# Patient Record
Sex: FEMALE | Race: WHITE | Employment: OTHER | URBAN - NONMETROPOLITAN AREA
[De-identification: names, ages, dates, MRNs, and addresses within clinical notes are randomized per-mention and may not be internally consistent; named-entity substitution may affect disease eponyms.]

---

## 2018-01-10 LAB
BUN BLDV-MCNC: 19 MG/DL
CALCIUM SERPL-MCNC: 8.8 MG/DL
CHLORIDE BLD-SCNC: 105 MMOL/L
CO2: 28 MMOL/L
CREAT SERPL-MCNC: 1.4 MG/DL
GFR CALCULATED: 39
GLUCOSE BLD-MCNC: 102 MG/DL
POTASSIUM SERPL-SCNC: 4 MMOL/L
SODIUM BLD-SCNC: 144 MMOL/L

## 2018-02-08 RX ORDER — LEVOTHYROXINE SODIUM 112 UG/1
112 TABLET ORAL DAILY
COMMUNITY

## 2018-02-08 RX ORDER — CHLORAL HYDRATE 500 MG
CAPSULE ORAL
COMMUNITY
End: 2019-04-05

## 2018-02-08 RX ORDER — LOSARTAN POTASSIUM 100 MG/1
100 TABLET ORAL DAILY
COMMUNITY

## 2018-02-08 RX ORDER — ATORVASTATIN CALCIUM 40 MG/1
40 TABLET, FILM COATED ORAL DAILY
COMMUNITY

## 2018-02-08 RX ORDER — AMLODIPINE BESYLATE 10 MG/1
10 TABLET ORAL DAILY
COMMUNITY
End: 2018-02-14 | Stop reason: ALTCHOICE

## 2018-02-14 ENCOUNTER — OFFICE VISIT (OUTPATIENT)
Dept: NEPHROLOGY | Age: 73
End: 2018-02-14
Payer: MEDICARE

## 2018-02-14 VITALS — DIASTOLIC BLOOD PRESSURE: 68 MMHG | HEART RATE: 72 BPM | OXYGEN SATURATION: 99 % | SYSTOLIC BLOOD PRESSURE: 134 MMHG

## 2018-02-14 DIAGNOSIS — N18.30 CKD (CHRONIC KIDNEY DISEASE), STAGE III (HCC): Primary | ICD-10-CM

## 2018-02-14 PROCEDURE — 1090F PRES/ABSN URINE INCON ASSESS: CPT | Performed by: INTERNAL MEDICINE

## 2018-02-14 PROCEDURE — G8421 BMI NOT CALCULATED: HCPCS | Performed by: INTERNAL MEDICINE

## 2018-02-14 PROCEDURE — G8427 DOCREV CUR MEDS BY ELIG CLIN: HCPCS | Performed by: INTERNAL MEDICINE

## 2018-02-14 PROCEDURE — G8400 PT W/DXA NO RESULTS DOC: HCPCS | Performed by: INTERNAL MEDICINE

## 2018-02-14 PROCEDURE — 99213 OFFICE O/P EST LOW 20 MIN: CPT | Performed by: INTERNAL MEDICINE

## 2018-02-14 PROCEDURE — 1123F ACP DISCUSS/DSCN MKR DOCD: CPT | Performed by: INTERNAL MEDICINE

## 2018-02-14 PROCEDURE — 1036F TOBACCO NON-USER: CPT | Performed by: INTERNAL MEDICINE

## 2018-02-14 PROCEDURE — 3014F SCREEN MAMMO DOC REV: CPT | Performed by: INTERNAL MEDICINE

## 2018-02-14 PROCEDURE — G8484 FLU IMMUNIZE NO ADMIN: HCPCS | Performed by: INTERNAL MEDICINE

## 2018-02-14 PROCEDURE — 3017F COLORECTAL CA SCREEN DOC REV: CPT | Performed by: INTERNAL MEDICINE

## 2018-02-14 PROCEDURE — 4040F PNEUMOC VAC/ADMIN/RCVD: CPT | Performed by: INTERNAL MEDICINE

## 2018-02-14 RX ORDER — RALOXIFENE HYDROCHLORIDE 60 MG/1
60 TABLET, FILM COATED ORAL DAILY
COMMUNITY
Start: 2018-01-18 | End: 2019-04-05

## 2018-02-14 RX ORDER — TERBINAFINE HYDROCHLORIDE 250 MG/1
250 TABLET ORAL DAILY
COMMUNITY
Start: 2018-01-05 | End: 2018-05-16

## 2018-02-14 ASSESSMENT — ENCOUNTER SYMPTOMS
SORE THROAT: 0
ABDOMINAL DISTENTION: 0
COUGH: 0
CHEST TIGHTNESS: 0
ABDOMINAL PAIN: 0
BACK PAIN: 0
RESPIRATORY NEGATIVE: 1
FACIAL SWELLING: 0
GASTROINTESTINAL NEGATIVE: 1
SHORTNESS OF BREATH: 0
WHEEZING: 0
NAUSEA: 0
VOMITING: 0
COLOR CHANGE: 0
BLOOD IN STOOL: 0
DIARRHEA: 0
CONSTIPATION: 0

## 2018-02-16 LAB
BUN BLDV-MCNC: 17 MG/DL
CALCIUM SERPL-MCNC: 9 MG/DL
CHLORIDE BLD-SCNC: 102 MMOL/L
CO2: 30 MMOL/L
CREAT SERPL-MCNC: 0.8 MG/DL
GFR CALCULATED: 60
GLUCOSE BLD-MCNC: 94 MG/DL
POTASSIUM SERPL-SCNC: 4.2 MMOL/L
SODIUM BLD-SCNC: 143 MMOL/L

## 2018-03-21 ENCOUNTER — OFFICE VISIT (OUTPATIENT)
Dept: NEPHROLOGY | Age: 73
End: 2018-03-21
Payer: MEDICARE

## 2018-03-21 VITALS
DIASTOLIC BLOOD PRESSURE: 62 MMHG | WEIGHT: 167.4 LBS | SYSTOLIC BLOOD PRESSURE: 116 MMHG | HEART RATE: 76 BPM | OXYGEN SATURATION: 98 %

## 2018-03-21 DIAGNOSIS — N18.2 CKD (CHRONIC KIDNEY DISEASE), STAGE II: Primary | ICD-10-CM

## 2018-03-21 PROCEDURE — 3014F SCREEN MAMMO DOC REV: CPT | Performed by: INTERNAL MEDICINE

## 2018-03-21 PROCEDURE — 1090F PRES/ABSN URINE INCON ASSESS: CPT | Performed by: INTERNAL MEDICINE

## 2018-03-21 PROCEDURE — 3017F COLORECTAL CA SCREEN DOC REV: CPT | Performed by: INTERNAL MEDICINE

## 2018-03-21 PROCEDURE — 1036F TOBACCO NON-USER: CPT | Performed by: INTERNAL MEDICINE

## 2018-03-21 PROCEDURE — G8427 DOCREV CUR MEDS BY ELIG CLIN: HCPCS | Performed by: INTERNAL MEDICINE

## 2018-03-21 PROCEDURE — 4040F PNEUMOC VAC/ADMIN/RCVD: CPT | Performed by: INTERNAL MEDICINE

## 2018-03-21 PROCEDURE — 99213 OFFICE O/P EST LOW 20 MIN: CPT | Performed by: INTERNAL MEDICINE

## 2018-03-21 PROCEDURE — G8484 FLU IMMUNIZE NO ADMIN: HCPCS | Performed by: INTERNAL MEDICINE

## 2018-03-21 PROCEDURE — G8400 PT W/DXA NO RESULTS DOC: HCPCS | Performed by: INTERNAL MEDICINE

## 2018-03-21 PROCEDURE — G8421 BMI NOT CALCULATED: HCPCS | Performed by: INTERNAL MEDICINE

## 2018-03-21 PROCEDURE — 1123F ACP DISCUSS/DSCN MKR DOCD: CPT | Performed by: INTERNAL MEDICINE

## 2018-03-21 RX ORDER — AMLODIPINE BESYLATE 10 MG/1
10 TABLET ORAL DAILY
COMMUNITY
Start: 2018-03-15 | End: 2018-12-27 | Stop reason: ALTCHOICE

## 2018-03-21 ASSESSMENT — ENCOUNTER SYMPTOMS
GASTROINTESTINAL NEGATIVE: 1
RESPIRATORY NEGATIVE: 1

## 2018-03-21 NOTE — PROGRESS NOTES
Visit Date: 3/21/2018      HPI:     Milena Deleon is a 71 y.o. female who presents today for:  Chief Complaint   Patient presents with    Chronic Kidney Disease     Stage II       Current Outpatient Prescriptions   Medication Sig Dispense Refill    amLODIPine (NORVASC) 10 MG tablet Take 10 mg by mouth daily       ciclopirox (PENLAC) 8 % solution       raloxifene (EVISTA) 60 MG tablet Take 60 mg by mouth daily       terbinafine (LAMISIL) 250 MG tablet Take 250 mg by mouth daily       Calcium Carb-Cholecalciferol (CALCIUM-VITAMIN D) 500-200 MG-UNIT per tablet Take 1 tablet by mouth 2 times daily (with meals)      levothyroxine (SYNTHROID) 112 MCG tablet Take 112 mcg by mouth Daily      atorvastatin (LIPITOR) 40 MG tablet Take 40 mg by mouth daily      losartan (COZAAR) 100 MG tablet Take 100 mg by mouth daily      Omega-3 1000 MG CAPS Take by mouth      Multiple Vitamins-Minerals (MULTIVITAMIN ADULTS PO) Take by mouth      Psyllium (METAMUCIL FIBER PO) Take by mouth       No current facility-administered medications for this visit. No Known Allergies    Past Medical History:   Diagnosis Date    DJD (degenerative joint disease)     Hepatic cyst     Hiatal hernia     Hyperlipidemia     Hypertension     Hypothyroidism       Past Surgical History:   Procedure Laterality Date    BLADDER REPAIR      TOTAL KNEE ARTHROPLASTY       History reviewed. No pertinent family history. Social History   Substance Use Topics    Smoking status: Never Smoker    Smokeless tobacco: Never Used    Alcohol use No        Subjective:      Review of Systems   Constitutional: Negative. HENT: Negative. Respiratory: Negative. Cardiovascular: Negative. Gastrointestinal: Negative. Musculoskeletal: Negative. Skin: Negative. Neurological: Negative. Psychiatric/Behavioral: Negative.         Objective:     BP (!) 142/78 (Site: Right Arm, Position: Sitting)   Pulse 76   Wt 167 lb 6.4 oz (75.9 kg)

## 2018-05-16 ENCOUNTER — OFFICE VISIT (OUTPATIENT)
Dept: ONCOLOGY | Age: 73
End: 2018-05-16
Payer: MEDICARE

## 2018-05-16 ENCOUNTER — HOSPITAL ENCOUNTER (OUTPATIENT)
Dept: INFUSION THERAPY | Age: 73
Discharge: HOME | End: 2018-05-16
Payer: MEDICARE

## 2018-05-16 VITALS
DIASTOLIC BLOOD PRESSURE: 75 MMHG | WEIGHT: 162 LBS | HEART RATE: 79 BPM | BODY MASS INDEX: 28.7 KG/M2 | TEMPERATURE: 97.3 F | SYSTOLIC BLOOD PRESSURE: 143 MMHG | RESPIRATION RATE: 18 BRPM | OXYGEN SATURATION: 99 % | HEIGHT: 63 IN

## 2018-05-16 DIAGNOSIS — N64.9 DISORDER OF BREAST: ICD-10-CM

## 2018-05-16 DIAGNOSIS — Z15.09 BRCA2 GENETIC CARRIER: Primary | ICD-10-CM

## 2018-05-16 DIAGNOSIS — Z15.01 BRCA2 GENETIC CARRIER: Primary | ICD-10-CM

## 2018-05-16 PROCEDURE — 1123F ACP DISCUSS/DSCN MKR DOCD: CPT | Performed by: INTERNAL MEDICINE

## 2018-05-16 PROCEDURE — 1036F TOBACCO NON-USER: CPT | Performed by: INTERNAL MEDICINE

## 2018-05-16 PROCEDURE — G8427 DOCREV CUR MEDS BY ELIG CLIN: HCPCS | Performed by: INTERNAL MEDICINE

## 2018-05-16 PROCEDURE — 4040F PNEUMOC VAC/ADMIN/RCVD: CPT | Performed by: INTERNAL MEDICINE

## 2018-05-16 PROCEDURE — 99203 OFFICE O/P NEW LOW 30 MIN: CPT | Performed by: INTERNAL MEDICINE

## 2018-05-16 PROCEDURE — G8400 PT W/DXA NO RESULTS DOC: HCPCS | Performed by: INTERNAL MEDICINE

## 2018-05-16 PROCEDURE — 99211 OFF/OP EST MAY X REQ PHY/QHP: CPT

## 2018-05-16 PROCEDURE — 3017F COLORECTAL CA SCREEN DOC REV: CPT | Performed by: INTERNAL MEDICINE

## 2018-05-16 PROCEDURE — 1090F PRES/ABSN URINE INCON ASSESS: CPT | Performed by: INTERNAL MEDICINE

## 2018-05-16 PROCEDURE — G8419 CALC BMI OUT NRM PARAM NOF/U: HCPCS | Performed by: INTERNAL MEDICINE

## 2018-05-16 RX ORDER — POLYETHYLENE GLYCOL 3350 17 G/17G
17 POWDER, FOR SOLUTION ORAL DAILY PRN
COMMUNITY
End: 2019-04-05

## 2018-05-16 RX ORDER — LORAZEPAM 1 MG/1
TABLET ORAL
Qty: 2 TABLET | Refills: 0 | Status: SHIPPED | OUTPATIENT
Start: 2018-05-16 | End: 2018-12-10 | Stop reason: SDUPTHER

## 2018-12-10 ENCOUNTER — HOSPITAL ENCOUNTER (OUTPATIENT)
Age: 73
Discharge: HOME | End: 2018-12-10
Payer: MEDICARE

## 2018-12-10 DIAGNOSIS — Z15.01 BRCA1 GENETIC CARRIER: Primary | ICD-10-CM

## 2018-12-10 DIAGNOSIS — Z15.01 BRCA2 GENETIC CARRIER: ICD-10-CM

## 2018-12-10 DIAGNOSIS — Z15.09 BRCA2 GENETIC CARRIER: ICD-10-CM

## 2018-12-10 DIAGNOSIS — Z15.09 BRCA1 GENETIC CARRIER: ICD-10-CM

## 2018-12-10 DIAGNOSIS — Z15.01 BRCA1 GENETIC CARRIER: ICD-10-CM

## 2018-12-10 DIAGNOSIS — Z15.09 BRCA1 GENETIC CARRIER: Primary | ICD-10-CM

## 2018-12-10 LAB
CREATININE, WHOLE BLOOD: 0.9 MG/DL (ref 0.5–1.2)
GFR, ESTIMATED ,CON: 65 ML/MIN/1.73M2

## 2018-12-10 PROCEDURE — 82565 ASSAY OF CREATININE: CPT

## 2018-12-10 RX ORDER — LORAZEPAM 1 MG/1
TABLET ORAL
Qty: 2 TABLET | Refills: 0 | Status: SHIPPED | OUTPATIENT
Start: 2018-12-10 | End: 2019-07-02

## 2018-12-26 DIAGNOSIS — Z15.01 BRCA2 GENETIC CARRIER: ICD-10-CM

## 2018-12-26 DIAGNOSIS — Z15.09 BRCA2 GENETIC CARRIER: ICD-10-CM

## 2018-12-26 DIAGNOSIS — N64.9 DISORDER OF BREAST: ICD-10-CM

## 2018-12-27 ENCOUNTER — OFFICE VISIT (OUTPATIENT)
Dept: ONCOLOGY | Age: 73
End: 2018-12-27
Payer: MEDICARE

## 2018-12-27 ENCOUNTER — HOSPITAL ENCOUNTER (OUTPATIENT)
Dept: INFUSION THERAPY | Age: 73
Discharge: HOME | End: 2018-12-27
Payer: MEDICARE

## 2018-12-27 VITALS
SYSTOLIC BLOOD PRESSURE: 146 MMHG | HEIGHT: 63 IN | WEIGHT: 163.4 LBS | HEART RATE: 82 BPM | DIASTOLIC BLOOD PRESSURE: 71 MMHG | BODY MASS INDEX: 28.95 KG/M2 | TEMPERATURE: 98.2 F | RESPIRATION RATE: 18 BRPM | OXYGEN SATURATION: 97 %

## 2018-12-27 DIAGNOSIS — Z15.01 BRCA GENE MUTATION POSITIVE: Primary | ICD-10-CM

## 2018-12-27 DIAGNOSIS — Z15.09 BRCA GENE MUTATION POSITIVE: Primary | ICD-10-CM

## 2018-12-27 DIAGNOSIS — I10 HYPERTENSION, UNSPECIFIED TYPE: ICD-10-CM

## 2018-12-27 PROCEDURE — G8419 CALC BMI OUT NRM PARAM NOF/U: HCPCS | Performed by: INTERNAL MEDICINE

## 2018-12-27 PROCEDURE — 1090F PRES/ABSN URINE INCON ASSESS: CPT | Performed by: INTERNAL MEDICINE

## 2018-12-27 PROCEDURE — G8400 PT W/DXA NO RESULTS DOC: HCPCS | Performed by: INTERNAL MEDICINE

## 2018-12-27 PROCEDURE — 3017F COLORECTAL CA SCREEN DOC REV: CPT | Performed by: INTERNAL MEDICINE

## 2018-12-27 PROCEDURE — G8484 FLU IMMUNIZE NO ADMIN: HCPCS | Performed by: INTERNAL MEDICINE

## 2018-12-27 PROCEDURE — 4040F PNEUMOC VAC/ADMIN/RCVD: CPT | Performed by: INTERNAL MEDICINE

## 2018-12-27 PROCEDURE — 1101F PT FALLS ASSESS-DOCD LE1/YR: CPT | Performed by: INTERNAL MEDICINE

## 2018-12-27 PROCEDURE — 1036F TOBACCO NON-USER: CPT | Performed by: INTERNAL MEDICINE

## 2018-12-27 PROCEDURE — 1123F ACP DISCUSS/DSCN MKR DOCD: CPT | Performed by: INTERNAL MEDICINE

## 2018-12-27 PROCEDURE — 99211 OFF/OP EST MAY X REQ PHY/QHP: CPT

## 2018-12-27 PROCEDURE — G8427 DOCREV CUR MEDS BY ELIG CLIN: HCPCS | Performed by: INTERNAL MEDICINE

## 2018-12-27 PROCEDURE — 99213 OFFICE O/P EST LOW 20 MIN: CPT | Performed by: INTERNAL MEDICINE

## 2018-12-27 RX ORDER — HYDROCHLOROTHIAZIDE 12.5 MG/1
25 TABLET ORAL DAILY
COMMUNITY
Start: 2018-11-19

## 2018-12-27 NOTE — PROGRESS NOTES
°F (36.8 °C)    TempSrc: Oral    SpO2: 97%    Weight: 163 lb 6.4 oz (74.1 kg)    Height: 5' 3\" (1.6 m)    Vitals reviewed and are stable. Constitutional: Well-developed and well-nourished. No acute distress. HENT: Normocephalic and atraumatic. Eyes: Pupils are equal and reactive. No scleral icterus. Neck: Overall appearance is symmetrical. No identifiable masses. Chest: Bilateral breast exam displays no palpable abnormalities. Surgical incisions are well-healed. There is no evidence of any localized recurrence. Pulmonary: Effort normal. No respiratory distress. Cardiovascular: RRR. No edema in any of the four extremities. Abdominal: Soft. No hepatomegaly or splenomegaly. Musculoskeletal: Gait is normal. Muscle strength and tone good. Neurological: Alert and oriented to person, place, and time. Judgment and thought content normal.  Skin: Skin is warm and dry. No rash. Psychiatric: Mood and affect appropriate for the clinical situation. Behavior is normal.        Assessment:   1. BRCA 2 mutation. 2.  Status post hysterectomy with bilateral oophorectomy. 3.  Hypertension. Plan:   1. Under for any evidence of malignancy. 2.  MRI of the breast on annual basis. 3.  Monitor blood pressure and follow up with primary care provider for further surveillance and management.     Seth Roblero M.D.  [address] [employer] [facility], [address] [place] [address], [address] [place] [address], [address] [facility]

## 2019-03-28 DIAGNOSIS — N18.30 CKD (CHRONIC KIDNEY DISEASE), STAGE III (HCC): Primary | ICD-10-CM

## 2019-03-29 LAB
BUN BLDV-MCNC: 20 MG/DL
CALCIUM SERPL-MCNC: 9.1 MG/DL
CHLORIDE BLD-SCNC: 102 MMOL/L
CO2: 29 MMOL/L
CREAT SERPL-MCNC: 0.8 MG/DL
GFR CALCULATED: >60
GLUCOSE BLD-MCNC: 70 MG/DL
POTASSIUM SERPL-SCNC: 4 MMOL/L
SODIUM BLD-SCNC: 144 MMOL/L

## 2019-04-04 NOTE — PROGRESS NOTES
[facility]    [facility], [address]   [place], [place] [facility]  [phone]  Progress Note  2019 4:02 PM      Pt Name:    Franklyn Swain  Birthdate:      Primary Care Physician:  Gretchen Goldstein MD     Chief Complaint:   Chief Complaint   Patient presents with    Follow-up     CKDIII        History of Present Illness: This is a follow-up visit for CKD 2. The patient was last seen in clinic 1 year ago by Dr. Ruth Falcon. Since the last visit the patient states her hydrochlorothiazide was increased because her BP was running higher, it is much better now and has been controlled at home. She has urinary frequency, denies any dysuria or hematuria. She drinks 4-6 cups of coffee a day. Additional history includes hypertension, hyperlipidemia, hypothyroidism. + BRCA mutation but no personal history of cancer. She is following with Dr. Batool Klein and are monitoring it. Pertinent items are noted in HPI. Past History:  Past Medical History:   Diagnosis Date    DJD (degenerative joint disease)     Hepatic cyst     Hiatal hernia     Hyperlipidemia     Hypertension     Hypothyroidism     Thyroid disease      Past Surgical History:   Procedure Laterality Date    BLADDER REPAIR       SECTION      DENTAL SURGERY  2008    HYSTERECTOMY  2006   [address]    and     TOTAL KNEE ARTHROPLASTY          VITALS:  /76 (Site: Right Upper Arm, Position: Sitting, Cuff Size: Large Adult)   Pulse 76   Ht 5' 3\" (1.6 m)   Wt 162 lb (73.5 kg)   SpO2 98%   BMI 28.70 kg/m²   Wt Readings from Last 3 Encounters:   19 162 lb (73.5 kg)   18 163 lb 6.4 oz (74.1 kg)   18 162 lb (73.5 kg)     Body mass index is 28.7 kg/m².      General Appearance: alert and cooperative with exam, appears comfortable, no distress  HEENT: EOMI, moist oral mucus membranes  Neck: No jugular venous distention, no carotid bruit  Lungs: Air entry B/L, no UROBILINOGEN, BILIRUBINUR, BLOODU, GLUCOSEU, KETUA, AMORPHOUS   Microalbumen/Creatinine ratio:  No components found for: RUCREAT        Impression/Plan:   1. 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 Goals of care include slowing rate of progression by controlling blood pressure, blood glucoses and albuminuria and by avoiding nephrotoxins such as NSAIDs and IV contrast.  2. HTN - controlled  3. Hypothyroidism        Bloodwork and medications were reviewed and plan of care discussed with the patient. Return to clinic in 1 year  or sooner if the need arises.       Amarilis Merchant, DO  [facility]

## 2019-04-05 ENCOUNTER — OFFICE VISIT (OUTPATIENT)
Dept: NEPHROLOGY | Age: 74
End: 2019-04-05
Payer: MEDICARE

## 2019-04-05 VITALS
SYSTOLIC BLOOD PRESSURE: 118 MMHG | DIASTOLIC BLOOD PRESSURE: 76 MMHG | HEIGHT: 63 IN | HEART RATE: 76 BPM | OXYGEN SATURATION: 98 % | WEIGHT: 162 LBS | BODY MASS INDEX: 28.7 KG/M2

## 2019-04-05 DIAGNOSIS — N18.2 CKD (CHRONIC KIDNEY DISEASE), STAGE II: Primary | ICD-10-CM

## 2019-04-05 PROCEDURE — 1090F PRES/ABSN URINE INCON ASSESS: CPT | Performed by: INTERNAL MEDICINE

## 2019-04-05 PROCEDURE — 1036F TOBACCO NON-USER: CPT | Performed by: INTERNAL MEDICINE

## 2019-04-05 PROCEDURE — 3017F COLORECTAL CA SCREEN DOC REV: CPT | Performed by: INTERNAL MEDICINE

## 2019-04-05 PROCEDURE — G8419 CALC BMI OUT NRM PARAM NOF/U: HCPCS | Performed by: INTERNAL MEDICINE

## 2019-04-05 PROCEDURE — G8427 DOCREV CUR MEDS BY ELIG CLIN: HCPCS | Performed by: INTERNAL MEDICINE

## 2019-04-05 PROCEDURE — 4040F PNEUMOC VAC/ADMIN/RCVD: CPT | Performed by: INTERNAL MEDICINE

## 2019-04-05 PROCEDURE — 99213 OFFICE O/P EST LOW 20 MIN: CPT | Performed by: INTERNAL MEDICINE

## 2019-04-05 PROCEDURE — G8400 PT W/DXA NO RESULTS DOC: HCPCS | Performed by: INTERNAL MEDICINE

## 2019-04-05 PROCEDURE — 1123F ACP DISCUSS/DSCN MKR DOCD: CPT | Performed by: INTERNAL MEDICINE

## 2019-07-02 ENCOUNTER — HOSPITAL ENCOUNTER (OUTPATIENT)
Dept: INFUSION THERAPY | Age: 74
Discharge: HOME | End: 2019-07-02
Payer: MEDICARE

## 2019-07-02 ENCOUNTER — OFFICE VISIT (OUTPATIENT)
Dept: ONCOLOGY | Age: 74
End: 2019-07-02
Payer: MEDICARE

## 2019-07-02 VITALS
SYSTOLIC BLOOD PRESSURE: 102 MMHG | WEIGHT: 163 LBS | OXYGEN SATURATION: 97 % | TEMPERATURE: 98.6 F | HEIGHT: 63 IN | HEART RATE: 67 BPM | DIASTOLIC BLOOD PRESSURE: 60 MMHG | BODY MASS INDEX: 28.88 KG/M2 | RESPIRATION RATE: 18 BRPM

## 2019-07-02 DIAGNOSIS — Z15.01 BRCA GENE MUTATION POSITIVE: Primary | ICD-10-CM

## 2019-07-02 DIAGNOSIS — Z12.31 ENCOUNTER FOR SCREENING MAMMOGRAM FOR MALIGNANT NEOPLASM OF BREAST: ICD-10-CM

## 2019-07-02 DIAGNOSIS — Z15.01 BRCA GENE MUTATION POSITIVE: ICD-10-CM

## 2019-07-02 DIAGNOSIS — Z15.09 BRCA GENE MUTATION POSITIVE: Primary | ICD-10-CM

## 2019-07-02 DIAGNOSIS — Z90.722 H/O OF HYSTERECTOMY WITH BILATERAL OOPHORECTOMY: ICD-10-CM

## 2019-07-02 DIAGNOSIS — Z15.09 BRCA GENE MUTATION POSITIVE: ICD-10-CM

## 2019-07-02 DIAGNOSIS — Z90.710 H/O OF HYSTERECTOMY WITH BILATERAL OOPHORECTOMY: ICD-10-CM

## 2019-07-02 LAB
ALBUMIN SERPL-MCNC: 4.1 G/DL (ref 3.5–5.1)
ALP BLD-CCNC: 55 U/L (ref 38–126)
ALT SERPL-CCNC: 18 U/L (ref 11–66)
AST SERPL-CCNC: 16 U/L (ref 5–40)
BILIRUB SERPL-MCNC: 1.1 MG/DL (ref 0.3–1.2)
BILIRUBIN DIRECT: < 0.2 MG/DL (ref 0–0.3)
BUN, WHOLE BLOOD: 20 MG/DL (ref 8–26)
CHLORIDE, WHOLE BLOOD: 98 MEQ/L (ref 98–109)
CREATININE, WHOLE BLOOD: 1 MG/DL (ref 0.5–1.2)
GFR, ESTIMATED ,CON: 58 ML/MIN/1.73M2
GLUCOSE, WHOLE BLOOD: 101 MG/DL (ref 70–108)
HCT VFR BLD CALC: 34.9 % (ref 37–47)
HEMOGLOBIN: 12.1 GM/DL (ref 12–16)
IONIZED CALCIUM, WHOLE BLOOD: 1.15 MMOL/L (ref 1.12–1.32)
MCH RBC QN AUTO: 28.5 PG (ref 27–31)
MCHC RBC AUTO-ENTMCNC: 34.5 GM/DL (ref 33–37)
MCV RBC AUTO: 83 FL (ref 81–99)
PDW BLD-RTO: 13.6 % (ref 11.5–14.5)
PLATELET # BLD: 205 THOU/MM3 (ref 130–400)
PMV BLD AUTO: 7.8 FL (ref 7.4–10.4)
POTASSIUM, WHOLE BLOOD: 3.6 MEQ/L (ref 3.5–4.9)
RBC # BLD: 4.22 MILL/MM3 (ref 4.2–5.4)
SEG NEUTROPHILS: 60 % (ref 43–75)
SEGMENTED NEUTROPHILS ABSOLUTE COUNT: 3.3 THOU/MM3 (ref 1.8–7.7)
SODIUM, WHOLE BLOOD: 140 MEQ/L (ref 138–146)
TOTAL CO2, WHOLE BLOOD: 30 MEQ/L (ref 23–33)
TOTAL PROTEIN: 6.6 G/DL (ref 6.1–8)
WBC # BLD: 5.5 THOU/MM3 (ref 4.8–10.8)

## 2019-07-02 PROCEDURE — 80076 HEPATIC FUNCTION PANEL: CPT

## 2019-07-02 PROCEDURE — 1036F TOBACCO NON-USER: CPT | Performed by: INTERNAL MEDICINE

## 2019-07-02 PROCEDURE — G8427 DOCREV CUR MEDS BY ELIG CLIN: HCPCS | Performed by: INTERNAL MEDICINE

## 2019-07-02 PROCEDURE — 80047 BASIC METABLC PNL IONIZED CA: CPT

## 2019-07-02 PROCEDURE — 4040F PNEUMOC VAC/ADMIN/RCVD: CPT | Performed by: INTERNAL MEDICINE

## 2019-07-02 PROCEDURE — 36415 COLL VENOUS BLD VENIPUNCTURE: CPT

## 2019-07-02 PROCEDURE — 85027 COMPLETE CBC AUTOMATED: CPT

## 2019-07-02 PROCEDURE — G8419 CALC BMI OUT NRM PARAM NOF/U: HCPCS | Performed by: INTERNAL MEDICINE

## 2019-07-02 PROCEDURE — G8400 PT W/DXA NO RESULTS DOC: HCPCS | Performed by: INTERNAL MEDICINE

## 2019-07-02 PROCEDURE — 99213 OFFICE O/P EST LOW 20 MIN: CPT | Performed by: INTERNAL MEDICINE

## 2019-07-02 PROCEDURE — 3017F COLORECTAL CA SCREEN DOC REV: CPT | Performed by: INTERNAL MEDICINE

## 2019-07-02 PROCEDURE — 99211 OFF/OP EST MAY X REQ PHY/QHP: CPT

## 2019-07-02 PROCEDURE — 1123F ACP DISCUSS/DSCN MKR DOCD: CPT | Performed by: INTERNAL MEDICINE

## 2019-07-02 PROCEDURE — 1090F PRES/ABSN URINE INCON ASSESS: CPT | Performed by: INTERNAL MEDICINE

## 2019-07-03 PROBLEM — Z12.31 ENCOUNTER FOR SCREENING MAMMOGRAM FOR MALIGNANT NEOPLASM OF BREAST: Status: ACTIVE | Noted: 2019-07-03

## 2019-07-03 PROBLEM — Z90.710 H/O OF HYSTERECTOMY WITH BILATERAL OOPHORECTOMY: Status: ACTIVE | Noted: 2019-07-03

## 2019-07-03 PROBLEM — Z90.722 H/O OF HYSTERECTOMY WITH BILATERAL OOPHORECTOMY: Status: ACTIVE | Noted: 2019-07-03

## 2019-08-02 PROBLEM — Z12.31 ENCOUNTER FOR SCREENING MAMMOGRAM FOR MALIGNANT NEOPLASM OF BREAST: Status: RESOLVED | Noted: 2019-07-03 | Resolved: 2019-08-02

## 2019-08-06 DIAGNOSIS — Z12.31 ENCOUNTER FOR SCREENING MAMMOGRAM FOR MALIGNANT NEOPLASM OF BREAST: ICD-10-CM

## 2019-08-06 DIAGNOSIS — Z15.09 BRCA GENE MUTATION POSITIVE: ICD-10-CM

## 2019-08-06 DIAGNOSIS — Z15.01 BRCA GENE MUTATION POSITIVE: ICD-10-CM

## 2019-12-02 DIAGNOSIS — Z15.09 BRCA2 GENETIC CARRIER: ICD-10-CM

## 2019-12-02 DIAGNOSIS — Z15.01 BRCA2 GENETIC CARRIER: ICD-10-CM

## 2019-12-02 RX ORDER — LORAZEPAM 1 MG/1
TABLET ORAL
Qty: 2 TABLET | Refills: 0 | Status: SHIPPED | OUTPATIENT
Start: 2019-12-02 | End: 2022-02-09 | Stop reason: SDUPTHER

## 2019-12-11 DIAGNOSIS — Z15.09 BRCA GENE MUTATION POSITIVE: ICD-10-CM

## 2019-12-11 DIAGNOSIS — Z15.01 BRCA GENE MUTATION POSITIVE: ICD-10-CM

## 2019-12-18 ENCOUNTER — HOSPITAL ENCOUNTER (OUTPATIENT)
Dept: INFUSION THERAPY | Age: 74
Discharge: HOME | End: 2019-12-18
Payer: MEDICARE

## 2019-12-18 ENCOUNTER — OFFICE VISIT (OUTPATIENT)
Dept: ONCOLOGY | Age: 74
End: 2019-12-18
Payer: MEDICARE

## 2019-12-18 VITALS
HEIGHT: 63 IN | OXYGEN SATURATION: 98 % | HEART RATE: 82 BPM | BODY MASS INDEX: 27.61 KG/M2 | RESPIRATION RATE: 18 BRPM | SYSTOLIC BLOOD PRESSURE: 129 MMHG | TEMPERATURE: 98 F | DIASTOLIC BLOOD PRESSURE: 66 MMHG | WEIGHT: 155.8 LBS

## 2019-12-18 DIAGNOSIS — Z12.31 ENCOUNTER FOR SCREENING MAMMOGRAM FOR MALIGNANT NEOPLASM OF BREAST: ICD-10-CM

## 2019-12-18 DIAGNOSIS — Z15.01 BRCA GENE MUTATION POSITIVE: Primary | ICD-10-CM

## 2019-12-18 DIAGNOSIS — Z15.09 BRCA GENE MUTATION POSITIVE: ICD-10-CM

## 2019-12-18 DIAGNOSIS — D64.9 ANEMIA, UNSPECIFIED TYPE: ICD-10-CM

## 2019-12-18 DIAGNOSIS — Z15.09 BRCA GENE MUTATION POSITIVE: Primary | ICD-10-CM

## 2019-12-18 DIAGNOSIS — Z15.01 BRCA GENE MUTATION POSITIVE: ICD-10-CM

## 2019-12-18 LAB
ALBUMIN SERPL-MCNC: 4.2 G/DL (ref 3.5–5.1)
ALP BLD-CCNC: 52 U/L (ref 38–126)
ALT SERPL-CCNC: 21 U/L (ref 11–66)
AST SERPL-CCNC: 20 U/L (ref 5–40)
BILIRUB SERPL-MCNC: 1 MG/DL (ref 0.3–1.2)
BILIRUBIN DIRECT: < 0.2 MG/DL (ref 0–0.3)
BUN, WHOLE BLOOD: 18 MG/DL (ref 8–26)
CHLORIDE, WHOLE BLOOD: 100 MEQ/L (ref 98–109)
CREATININE, WHOLE BLOOD: 1.1 MG/DL (ref 0.5–1.2)
GFR, ESTIMATED ,CON: 52 ML/MIN/1.73M2
GLUCOSE, WHOLE BLOOD: 91 MG/DL (ref 70–108)
HCT VFR BLD CALC: 34.2 % (ref 37–47)
HEMOGLOBIN: 11.1 GM/DL (ref 12–16)
IONIZED CALCIUM, WHOLE BLOOD: 1.14 MMOL/L (ref 1.12–1.32)
MCH RBC QN AUTO: 26.5 PG (ref 27–31)
MCHC RBC AUTO-ENTMCNC: 32.5 GM/DL (ref 33–37)
MCV RBC AUTO: 81 FL (ref 81–99)
PDW BLD-RTO: 13.3 % (ref 11.5–14.5)
PLATELET # BLD: 283 THOU/MM3 (ref 130–400)
PMV BLD AUTO: 8.1 FL (ref 7.4–10.4)
POTASSIUM, WHOLE BLOOD: 3.6 MEQ/L (ref 3.5–4.9)
RBC # BLD: 4.2 MILL/MM3 (ref 4.2–5.4)
SODIUM, WHOLE BLOOD: 140 MEQ/L (ref 138–146)
TOTAL CO2, WHOLE BLOOD: 30 MEQ/L (ref 23–33)
TOTAL PROTEIN: 6.8 G/DL (ref 6.1–8)
WBC # BLD: 4.8 THOU/MM3 (ref 4.8–10.8)

## 2019-12-18 PROCEDURE — G8400 PT W/DXA NO RESULTS DOC: HCPCS | Performed by: INTERNAL MEDICINE

## 2019-12-18 PROCEDURE — 80076 HEPATIC FUNCTION PANEL: CPT

## 2019-12-18 PROCEDURE — 1123F ACP DISCUSS/DSCN MKR DOCD: CPT | Performed by: INTERNAL MEDICINE

## 2019-12-18 PROCEDURE — 1090F PRES/ABSN URINE INCON ASSESS: CPT | Performed by: INTERNAL MEDICINE

## 2019-12-18 PROCEDURE — G8484 FLU IMMUNIZE NO ADMIN: HCPCS | Performed by: INTERNAL MEDICINE

## 2019-12-18 PROCEDURE — G8427 DOCREV CUR MEDS BY ELIG CLIN: HCPCS | Performed by: INTERNAL MEDICINE

## 2019-12-18 PROCEDURE — 1036F TOBACCO NON-USER: CPT | Performed by: INTERNAL MEDICINE

## 2019-12-18 PROCEDURE — 4040F PNEUMOC VAC/ADMIN/RCVD: CPT | Performed by: INTERNAL MEDICINE

## 2019-12-18 PROCEDURE — 85027 COMPLETE CBC AUTOMATED: CPT

## 2019-12-18 PROCEDURE — 80047 BASIC METABLC PNL IONIZED CA: CPT

## 2019-12-18 PROCEDURE — 99213 OFFICE O/P EST LOW 20 MIN: CPT | Performed by: INTERNAL MEDICINE

## 2019-12-18 PROCEDURE — G8417 CALC BMI ABV UP PARAM F/U: HCPCS | Performed by: INTERNAL MEDICINE

## 2019-12-18 PROCEDURE — 36415 COLL VENOUS BLD VENIPUNCTURE: CPT

## 2019-12-18 PROCEDURE — 3017F COLORECTAL CA SCREEN DOC REV: CPT | Performed by: INTERNAL MEDICINE

## 2019-12-18 PROCEDURE — 99211 OFF/OP EST MAY X REQ PHY/QHP: CPT

## 2020-03-17 LAB
BUN BLDV-MCNC: 17 MG/DL
CALCIUM SERPL-MCNC: 9.8 MG/DL
CHLORIDE BLD-SCNC: 100 MMOL/L
CO2: 28 MMOL/L
CREAT SERPL-MCNC: 0.9 MG/DL
CREATININE, URINE: 38.8
GFR CALCULATED: >60
GLUCOSE BLD-MCNC: 84 MG/DL
MICROALBUMIN/CREAT 24H UR: <1.2 MG/G{CREAT}
MICROALBUMIN/CREAT UR-RTO: <31
POTASSIUM SERPL-SCNC: 4.1 MMOL/L
SODIUM BLD-SCNC: 142 MMOL/L

## 2020-05-26 ENCOUNTER — OFFICE VISIT (OUTPATIENT)
Dept: NEPHROLOGY | Age: 75
End: 2020-05-26
Payer: MEDICARE

## 2020-05-26 VITALS
SYSTOLIC BLOOD PRESSURE: 128 MMHG | OXYGEN SATURATION: 98 % | DIASTOLIC BLOOD PRESSURE: 66 MMHG | HEART RATE: 72 BPM | WEIGHT: 154 LBS | BODY MASS INDEX: 27.28 KG/M2

## 2020-05-26 PROCEDURE — 99213 OFFICE O/P EST LOW 20 MIN: CPT | Performed by: INTERNAL MEDICINE

## 2020-05-26 NOTE — PROGRESS NOTES
[facility]    [facility], [address]   [place] [address]  [phone]  Progress Note  2020 10:00 AM      Pt Name:    Ailin Blandon  Birthdate:      Primary Care Physician:  Connie Kulkarni MD     Chief Complaint:   Chief Complaint   Patient presents with    Follow-up     CKD II        History of Present Illness: This is a follow-up visit for CKD 2. Doing well. BP is controlled. No edema. Has urinary frequency at night. Started on Metformin in the past year for diabetes. She is trying to lose 10 pounds. Additional history includes hypertension, hyperlipidemia, hypothyroidism. + BRCA mutation but no personal history of cancer. She is following with Dr. Mickey Chandler. Pertinent items are noted in HPI. Past History:  Past Medical History:   Diagnosis Date    DJD (degenerative joint disease)     Hepatic cyst     Hiatal hernia     Hyperlipidemia     Hypertension     Hypothyroidism     Thyroid disease      Past Surgical History:   Procedure Laterality Date    BLADDER REPAIR       SECTION      DENTAL SURGERY      HYSTERECTOMY     [address]    and     TOTAL KNEE ARTHROPLASTY          VITALS:  /66 (Site: Left Upper Arm, Position: Sitting, Cuff Size: Large Adult)   Pulse 72   Wt 154 lb (69.9 kg)   SpO2 98%   BMI 27.28 kg/m²   Wt Readings from Last 3 Encounters:   20 154 lb (69.9 kg)   19 155 lb 12.8 oz (70.7 kg)   19 163 lb (73.9 kg)     Body mass index is 27.28 kg/m².      General Appearance: alert and cooperative with exam, appears comfortable, no distress  HEENT: EOMI, moist oral mucus membranes  Neck: No jugular venous distention, no carotid bruit  Lungs: Air entry B/L, no crackles or rales, no use of accessory muscles  Heart: S1, S2 heard, no rub  GI: soft, non-tender, no guarding, no flank pain  Extremities: No sig LE edema, no cyanosis  Skin: warm, dry  Neurologic: no

## 2020-08-28 NOTE — PROGRESS NOTES
[facility] CANCER CENTER  CANCER NETWORK OF [facility]  [facility] OF  [facility] [address] [facility]  [address]  [address]  Dept: [phone]  Dept Fax: [phone]  Loc: [phone]    Subjective:      Chief Complaint:  Travis Stanley is a 69 y.o. female with BRCA 2 mutation. HPI: Denisha Lancaster is here today for follow-up regarding her history of a BRCA2 mutation. She previously has had a hysterectomy with bilateral oophorectomy completed as a result of her genetic testing. The patient elected not to proceed with a mastectomy and has been under close surveillance for breast malignancy. She had a mammogram completed on August 25, 2020. This was reported as a benign appearing mammogram.  The results were reviewed with the patient today. She has no signs or symptoms on today's evaluation that would be suggestive of malignancy. Her overall health has been good. She has not had fever, cough, shortness of breath or other signs of infection. The patient's bowel and bladder habits have been normal.  She has not seen blood in her stool or urine. The patient remains active with an ECOG performance status of level 0. PMH, SH, and FH:  I reviewed the patients medication list and allergy list as noted on the electronic medical record. The PMH, SH and FH were also reviewed as noted on the EMR. Review of Systems  Constitutional: Negative. HENT: Negative. Eyes: Negative. Respiratory: Negative. Cardiovascular: Negative. Gastrointestinal: Negative. Genitourinary: Negative. Musculoskeletal: Negative. Skin: Negative. Neurological: Negative. Hematological: Negative. Psychiatric/Behavioral: Negative. Objective:   Physical Exam  Vitals:    09/01/20 1052   BP: (!) 153/73   Pulse: 70   Resp: 18   Temp: 97 °F (36.1 °C)   SpO2: 96%   Vitals reviewed and are stable. Constitutional: Well-developed and well-nourished.  No acute distress. HENT: Normocephalic and atraumatic. Eyes: Pupils are equal and reactive. No scleral icterus. Neck: Overall appearance is symmetrical. No identifiable masses. Breasts: Bilateral breast exam displays no palpable abnormalities. .  Pulmonary: Effort normal. No respiratory distress. .  Neurological: Alert and oriented to person, place, and time. Judgment and thought content normal.  Skin: Skin is warm and dry. No rash. Psychiatric: Mood and affect appropriate for the clinical situation. Behavior is normal.        Data Analysis: The following studies were reviewed with the patient today:    Hematology 9/1/2020 12/18/2019 7/2/2019   WBC 5.6 4.8 5.5   RBC 4.53 4.20 4.22   HGB 12.5 11.1 (L) 12.1   HCT 38.9 34.2 (L) 34.9 (L)   MCV 86 81 83   RDW 15.2 (H) 13.3 13.6    283 205     Assessment:   1. BRCA 2 mutation. 2.  Status post hysterectomy with bilateral oophorectomy. .      Plan:   1. Monitor for any evidence of development of malignancy including breast evaluation. 2.  MRI of the breast in December 2020. Zack Conley M.D. Medical Director: [facility]  Cancer Network [facility]  [address] [employer] [facility], [address] [place] [address], [address] [place] [address], [address] [place] [address] of the [facility] at the [facility]      **This report has been created using voice recognition software. It may contain minor errors which are inherent in voice recognition technology. **

## 2020-09-01 ENCOUNTER — OFFICE VISIT (OUTPATIENT)
Dept: ONCOLOGY | Age: 75
End: 2020-09-01
Payer: MEDICARE

## 2020-09-01 ENCOUNTER — HOSPITAL ENCOUNTER (OUTPATIENT)
Dept: INFUSION THERAPY | Age: 75
Discharge: HOME | End: 2020-09-01
Payer: MEDICARE

## 2020-09-01 VITALS
WEIGHT: 158.6 LBS | BODY MASS INDEX: 28.1 KG/M2 | HEART RATE: 70 BPM | HEIGHT: 63 IN | TEMPERATURE: 97 F | SYSTOLIC BLOOD PRESSURE: 153 MMHG | RESPIRATION RATE: 18 BRPM | OXYGEN SATURATION: 96 % | DIASTOLIC BLOOD PRESSURE: 73 MMHG

## 2020-09-01 DIAGNOSIS — Z15.09 BRCA GENE MUTATION POSITIVE: ICD-10-CM

## 2020-09-01 DIAGNOSIS — Z15.01 BRCA GENE MUTATION POSITIVE: ICD-10-CM

## 2020-09-01 LAB
CHLORIDE, WHOLE BLOOD: 106 MEQ/L (ref 98–109)
CREATININE, WHOLE BLOOD: 1 MG/DL (ref 0.5–1.2)
GFR, ESTIMATED ,CON: 58 ML/MIN/1.73M2
GLUCOSE, WHOLE BLOOD: 84 MG/DL (ref 70–108)
HCT VFR BLD CALC: 38.9 % (ref 37–47)
HEMOGLOBIN: 12.5 GM/DL (ref 12–16)
IONIZED CALCIUM, WHOLE BLOOD: 1.16 MMOL/L (ref 1.12–1.32)
MCH RBC QN AUTO: 27.6 PG (ref 26–33)
MCHC RBC AUTO-ENTMCNC: 32.1 GM/DL (ref 32.2–35.5)
MCV RBC AUTO: 86 FL (ref 81–99)
PDW BLD-RTO: 15.2 % (ref 11.5–14.5)
PLATELET # BLD: 213 THOU/MM3 (ref 130–400)
PMV BLD AUTO: 10 FL (ref 9.4–12.4)
POTASSIUM, WHOLE BLOOD: 4 MEQ/L (ref 3.5–4.9)
RBC # BLD: 4.53 MILL/MM3 (ref 4.2–5.4)
SEG NEUTROPHILS: 66 % (ref 43–75)
SEGMENTED NEUTROPHILS ABSOLUTE COUNT: 3.7 THOU/MM3 (ref 1.8–7.7)
SODIUM, WHOLE BLOOD: 143 MEQ/L (ref 138–146)
WBC # BLD: 5.6 THOU/MM3 (ref 4.8–10.8)

## 2020-09-01 PROCEDURE — 36415 COLL VENOUS BLD VENIPUNCTURE: CPT

## 2020-09-01 PROCEDURE — 80047 BASIC METABLC PNL IONIZED CA: CPT

## 2020-09-01 PROCEDURE — 80076 HEPATIC FUNCTION PANEL: CPT

## 2020-09-01 PROCEDURE — 99213 OFFICE O/P EST LOW 20 MIN: CPT | Performed by: INTERNAL MEDICINE

## 2020-09-01 PROCEDURE — 85027 COMPLETE CBC AUTOMATED: CPT

## 2020-09-01 PROCEDURE — 84520 ASSAY OF UREA NITROGEN: CPT

## 2020-09-01 PROCEDURE — 99211 OFF/OP EST MAY X REQ PHY/QHP: CPT

## 2020-09-01 PROCEDURE — 82374 ASSAY BLOOD CARBON DIOXIDE: CPT

## 2020-09-02 LAB
ALBUMIN SERPL-MCNC: 3.9 G/DL (ref 3.5–5.1)
ALP BLD-CCNC: 48 U/L (ref 38–126)
ALT SERPL-CCNC: 21 U/L (ref 11–66)
AST SERPL-CCNC: 19 U/L (ref 5–40)
BILIRUB SERPL-MCNC: 1.1 MG/DL (ref 0.3–1.2)
BILIRUBIN DIRECT: < 0.2 MG/DL (ref 0–0.3)
BUN BLDV-MCNC: 16 MG/DL (ref 7–22)
CO2: 27 MEQ/L (ref 23–33)
TOTAL PROTEIN: 6.4 G/DL (ref 6.1–8)

## 2020-11-16 RX ORDER — LORAZEPAM 1 MG/1
TABLET ORAL
Qty: 2 TABLET | Refills: 0 | Status: SHIPPED | OUTPATIENT
Start: 2020-11-16 | End: 2020-12-16 | Stop reason: SDUPTHER

## 2020-12-01 ENCOUNTER — TELEPHONE (OUTPATIENT)
Dept: ONCOLOGY | Age: 75
End: 2020-12-01

## 2020-12-02 ENCOUNTER — HOSPITAL ENCOUNTER (OUTPATIENT)
Age: 75
Discharge: HOME | End: 2020-12-02
Payer: MEDICARE

## 2020-12-02 DIAGNOSIS — Z12.31 ENCOUNTER FOR SCREENING MAMMOGRAM FOR MALIGNANT NEOPLASM OF BREAST: ICD-10-CM

## 2020-12-02 PROCEDURE — 36415 COLL VENOUS BLD VENIPUNCTURE: CPT

## 2020-12-02 PROCEDURE — 82565 ASSAY OF CREATININE: CPT

## 2020-12-03 LAB
CREAT SERPL-MCNC: 1 MG/DL (ref 0.4–1.2)
GFR SERPL CREATININE-BSD FRML MDRD: 54 ML/MIN/1.73M2

## 2020-12-16 ENCOUNTER — TELEPHONE (OUTPATIENT)
Dept: ONCOLOGY | Age: 75
End: 2020-12-16

## 2020-12-16 RX ORDER — LORAZEPAM 1 MG/1
TABLET ORAL
Qty: 2 TABLET | Refills: 0 | Status: SHIPPED | OUTPATIENT
Start: 2020-12-16 | End: 2021-08-02

## 2020-12-16 NOTE — TELEPHONE ENCOUNTER
Patient called asking if you can re-presribe her one Ativan pill. She took the one as directed before her MRI and it got cancelled on her last minute. Is this possible? Please advise, thank you.

## 2021-02-23 ENCOUNTER — OFFICE VISIT (OUTPATIENT)
Dept: ONCOLOGY | Age: 76
End: 2021-02-23
Payer: MEDICARE

## 2021-02-23 ENCOUNTER — HOSPITAL ENCOUNTER (OUTPATIENT)
Dept: INFUSION THERAPY | Age: 76
Discharge: HOME | End: 2021-02-23
Payer: MEDICARE

## 2021-02-23 VITALS
WEIGHT: 154.2 LBS | HEART RATE: 67 BPM | OXYGEN SATURATION: 98 % | SYSTOLIC BLOOD PRESSURE: 153 MMHG | TEMPERATURE: 97.8 F | HEIGHT: 63 IN | RESPIRATION RATE: 18 BRPM | BODY MASS INDEX: 27.32 KG/M2 | DIASTOLIC BLOOD PRESSURE: 70 MMHG

## 2021-02-23 DIAGNOSIS — Z12.31 ENCOUNTER FOR SCREENING MAMMOGRAM FOR MALIGNANT NEOPLASM OF BREAST: ICD-10-CM

## 2021-02-23 DIAGNOSIS — Z15.09 BRCA2 GENETIC CARRIER: Primary | ICD-10-CM

## 2021-02-23 DIAGNOSIS — Z15.01 BRCA2 GENETIC CARRIER: Primary | ICD-10-CM

## 2021-02-23 DIAGNOSIS — Z15.01 BRCA GENE MUTATION POSITIVE: ICD-10-CM

## 2021-02-23 DIAGNOSIS — Z15.09 BRCA GENE MUTATION POSITIVE: ICD-10-CM

## 2021-02-23 LAB
ABSOLUTE IMMATURE GRANULOCYTE: 0 THOU/MM3 (ref 0–0.07)
BASINOPHIL, AUTOMATED: 0 % (ref 0–3)
BASOPHILS ABSOLUTE: 0 THOU/MM3 (ref 0–0.1)
BUN, WHOLE BLOOD: 18 MG/DL (ref 8–26)
CHLORIDE, WHOLE BLOOD: 102 MEQ/L (ref 98–109)
CREATININE, WHOLE BLOOD: 1.1 MG/DL (ref 0.5–1.2)
EOSINOPHILS ABSOLUTE: 0.2 THOU/MM3 (ref 0–0.4)
EOSINOPHILS RELATIVE PERCENT: 5 % (ref 0–4)
GFR, ESTIMATED ,CON: 52 ML/MIN/1.73M2
GLUCOSE, WHOLE BLOOD: 88 MG/DL (ref 70–108)
HCT VFR BLD CALC: 39.5 % (ref 37–47)
HEMOGLOBIN: 12.9 GM/DL (ref 12–16)
IMMATURE GRANULOCYTES: 0 %
IONIZED CALCIUM, WHOLE BLOOD: 1.14 MMOL/L (ref 1.12–1.32)
LYMPHOCYTES # BLD: 20 % (ref 15–47)
LYMPHOCYTES ABSOLUTE: 1 THOU/MM3 (ref 1–4.8)
MCH RBC QN AUTO: 28.7 PG (ref 26–33)
MCHC RBC AUTO-ENTMCNC: 32.7 GM/DL (ref 32.2–35.5)
MCV RBC AUTO: 88 FL (ref 81–99)
MONOCYTES ABSOLUTE: 0.4 THOU/MM3 (ref 0.4–1.3)
MONOCYTES: 8 % (ref 0–12)
PDW BLD-RTO: 13.5 % (ref 11.5–14.5)
PLATELET # BLD: 197 THOU/MM3 (ref 130–400)
PMV BLD AUTO: 10.2 FL (ref 9.4–12.4)
POTASSIUM, WHOLE BLOOD: 3.7 MEQ/L (ref 3.5–4.9)
RBC # BLD: 4.49 MILL/MM3 (ref 4.2–5.4)
SEG NEUTROPHILS: 67 % (ref 43–75)
SEGMENTED NEUTROPHILS ABSOLUTE COUNT: 3.4 THOU/MM3 (ref 1.8–7.7)
SODIUM, WHOLE BLOOD: 140 MEQ/L (ref 138–146)
TOTAL CO2, WHOLE BLOOD: 31 MEQ/L (ref 23–33)
WBC # BLD: 5.1 THOU/MM3 (ref 4.8–10.8)

## 2021-02-23 PROCEDURE — 36415 COLL VENOUS BLD VENIPUNCTURE: CPT

## 2021-02-23 PROCEDURE — 80047 BASIC METABLC PNL IONIZED CA: CPT

## 2021-02-23 PROCEDURE — 85025 COMPLETE CBC W/AUTO DIFF WBC: CPT

## 2021-02-23 PROCEDURE — 99211 OFF/OP EST MAY X REQ PHY/QHP: CPT

## 2021-02-23 PROCEDURE — 99213 OFFICE O/P EST LOW 20 MIN: CPT | Performed by: INTERNAL MEDICINE

## 2021-02-23 PROCEDURE — 80076 HEPATIC FUNCTION PANEL: CPT

## 2021-02-23 NOTE — PATIENT INSTRUCTIONS
1.  Mammogram prior to RTC. 2.  Labs on RTC. 3.  Referral to [address] for gynecological evaluation.

## 2021-02-23 NOTE — PROGRESS NOTES
[facility] CANCER CENTER  CANCER NETWORK OF [facility]  ONCOLOGY SPECIALISTS OF [facility] [address] [facility] 98  [address] [facility] [address] [facility] [address]  Dept: [phone]  Dept Fax: [phone]  Loc: [phone]    Subjective:      Chief Complaint:  Gustavo Callahan is a 77 y.o. female with BRCA 2 mutation. HPI: Stephan Hannah is here today for follow-up regarding history of BRCA2 mutation. As previously noted she has had a hysterectomy with bilateral oophorectomy that was completed after receiving the results of her genetic testing. She elected not to proceed with mastectomy but has been undergoing close surveillance. An MRI of the breast completed in December 2020 was unremarkable. Her most recent mammogram was in August 2020 which also was a benign appearing mammogram.  On today's evaluation the patient has no specific signs or symptoms that be suggestive the development of malignancy. She denies skeletal pain. Her bowel and bladder habits have been normal.  The patient's not had fever, cough, shortness of breath or other signs of infection. She has not had blood in her stool. The patient has requested to have gynecological follow-up regarding her history of hysterectomy and a referral will be made to the the gynecology group in [facility]. She has not had abdominal pain or evidence of vaginal blood loss. The patient remains active and has ECOG performance status of level 0. PMH, SH, and FH:  I reviewed the patients medication list and allergy list as noted on the electronic medical record. The PMH, SH and FH were also reviewed as noted on the EMR. Review of Systems  Constitutional: Negative. HENT: Negative. Eyes: Negative. Respiratory: Negative. Cardiovascular: Negative. Gastrointestinal: Negative. Genitourinary: Negative. Musculoskeletal: Negative. Skin: Negative. Neurological: Negative. Hematological: Negative. Psychiatric/Behavioral: Negative. Objective:   Physical Exam  Vitals:    02/23/21 1115   BP: (!) 153/70   Pulse: 67   Resp: 18   Temp: 97.8 °F (36.6 °C)   SpO2: 98%   Vitals reviewed and are stable. Constitutional: Well-developed. No acute distress. HENT: Normocephalic and atraumatic. Eyes: PERRL. No scleral icterus. Neck: Overall appearance is symmetrical. No identifiable mass. Chest: Bilateral breast exam displays no palpable abnormalities. Surgical incisions are well-healed. There is no evidence of any localized recurrence. Pulmonary: Effort normal. No respiratory distress. Cardiovascular: RRR. No edema in any of the four extremities. Abdominal: No hepatomegaly or splenomegaly. Musculoskeletal: Gait is normal. Muscle strength and tone appear normal.  Neurological: Alert and oriented to person, place, and time. Judgment and thought content normal.  Skin: Skin appears warm and dry. Psychiatric: Mood and affect appropriate for the clinical situation. Data Analysis: The following studies were reviewed with the patient today:    Hematology 2/23/2021 9/1/2020 12/18/2019   WBC 5.1 5.6 4.8   RBC 4.49 4.53 4.20   HGB 12.9 12.5 11.1 (L)   HCT 39.5 38.9 34.2 (L)   MCV 88 86 81   RDW 13.5 15.2 (H) 13.3    213 283     Assessment:   1. BRCA 2 mutation. 2.  Status post hysterectomy with bilateral oophorectomy. Plan:   1. Monitor for any evidence of development of malignancy including breast evaluation. 2.  Bilateral screening mammogram in August 2021. Srikanth Bryant M.D.                                                                          Medical Director: [facility]  Cancer Network [facility]  [address], [address] [facility], [address] [place] [address], [address] [facility], [facility] [place] [address] [facility]      **This report has been created using voice recognition software. It may contain minor errors which are inherent in voice recognition technology. **

## 2021-02-24 ENCOUNTER — TELEPHONE (OUTPATIENT)
Dept: ONCOLOGY | Age: 76
End: 2021-02-24

## 2021-02-24 LAB
ALBUMIN SERPL-MCNC: 4.3 G/DL (ref 3.5–5.1)
ALP BLD-CCNC: 49 U/L (ref 38–126)
ALT SERPL-CCNC: 17 U/L (ref 11–66)
AST SERPL-CCNC: 19 U/L (ref 5–40)
BILIRUB SERPL-MCNC: 1.3 MG/DL (ref 0.3–1.2)
BILIRUBIN DIRECT: < 0.2 MG/DL (ref 0–0.3)
TOTAL PROTEIN: 7 G/DL (ref 6.1–8)

## 2021-05-21 LAB
BUN BLDV-MCNC: 20 MG/DL
CALCIUM SERPL-MCNC: 9.3 MG/DL
CHLORIDE BLD-SCNC: 102 MMOL/L
CO2: 27 MMOL/L
CREAT SERPL-MCNC: 0.8 MG/DL
CREATININE, URINE: 61.8
GFR CALCULATED: >60
GLUCOSE BLD-MCNC: 89 MG/DL
MICROALBUMIN/CREAT 24H UR: <1.2 MG/G{CREAT}
MICROALBUMIN/CREAT UR-RTO: <19
POTASSIUM SERPL-SCNC: 4 MMOL/L
SODIUM BLD-SCNC: 140 MMOL/L

## 2021-05-25 ENCOUNTER — OFFICE VISIT (OUTPATIENT)
Dept: NEPHROLOGY | Age: 76
End: 2021-05-25
Payer: MEDICARE

## 2021-05-25 VITALS
WEIGHT: 149 LBS | HEART RATE: 62 BPM | HEIGHT: 63 IN | BODY MASS INDEX: 26.4 KG/M2 | DIASTOLIC BLOOD PRESSURE: 68 MMHG | SYSTOLIC BLOOD PRESSURE: 124 MMHG | OXYGEN SATURATION: 98 %

## 2021-05-25 DIAGNOSIS — I10 ESSENTIAL HYPERTENSION: ICD-10-CM

## 2021-05-25 DIAGNOSIS — N18.2 CKD (CHRONIC KIDNEY DISEASE), STAGE II: Primary | ICD-10-CM

## 2021-05-25 PROCEDURE — 99213 OFFICE O/P EST LOW 20 MIN: CPT | Performed by: INTERNAL MEDICINE

## 2021-05-25 NOTE — PROGRESS NOTES
[facility]    [facility], [address]   [place], [place] [facility]  [phone]  Progress Note  2021 10:00 AM      Pt Name:    Teo Rosenberg  Birthdate:      Primary Care Physician:  Sandrita Cox MD     Chief Complaint:   Chief Complaint   Patient presents with    Follow-up     CKD II        History of Present Illness: This is a follow-up visit for CKD 2. Doing well. BP controlled. Trying to lose some weight. Going to a dietician class. Additional history includes DM, hypertension, hyperlipidemia, hypothyroidism. + BRCA mutation but no personal history of cancer. She is following with Dr. Dena Tijerina. Pertinent items are noted in HPI. Past History:  Past Medical History:   Diagnosis Date    DJD (degenerative joint disease)     Hepatic cyst     Hiatal hernia     Hyperlipidemia     Hypertension     Hypothyroidism     Thyroid disease      Past Surgical History:   Procedure Laterality Date    BLADDER REPAIR       SECTION      DENTAL SURGERY      HYSTERECTOMY     [address]    and     TOTAL KNEE ARTHROPLASTY          VITALS:  /68 (Site: Right Upper Arm, Position: Sitting, Cuff Size: Large Adult)   Pulse 62   Ht 5' 3\" (1.6 m)   Wt 149 lb (67.6 kg)   SpO2 98%   BMI 26.39 kg/m²   Wt Readings from Last 3 Encounters:   21 149 lb (67.6 kg)   21 154 lb 3.2 oz (69.9 kg)   20 158 lb 9.6 oz (71.9 kg)     Body mass index is 26.39 kg/m².      General Appearance: alert and cooperative with exam, appears comfortable, no distress  HEENT: EOMI, moist oral mucus membranes  Neck: No jugular venous distention,   Lungs: Air entry B/L, no crackles or rales, no use of accessory muscles  Heart: S1, S2 heard, no rub  GI: soft, non-tender, no guarding, no flank pain  Extremities: No sig LE edema, no cyanosis  Skin: warm, dry  Neurologic: no tremor, no asterixis, no focal neurologic deficits Medications:  Current Outpatient Medications   Medication Sig Dispense Refill    LORazepam (ATIVAN) 1 MG tablet Take one tablet six hours and one hour before the scheduled MRI. 2 tablet 0    metFORMIN (GLUCOPHAGE) 500 MG tablet       hydrochlorothiazide (HYDRODIURIL) 12.5 MG tablet Take 25 mg by mouth daily       Calcium Carb-Cholecalciferol (CALCIUM-VITAMIN D) 500-200 MG-UNIT per tablet Take 1 tablet by mouth 2 times daily (with meals)      levothyroxine (SYNTHROID) 112 MCG tablet Take 112 mcg by mouth Daily      atorvastatin (LIPITOR) 40 MG tablet Take 40 mg by mouth daily      losartan (COZAAR) 100 MG tablet Take 100 mg by mouth daily       No current facility-administered medications for this visit.         Laboratory & Diagnostics:  CBC:   Lab Results   Component Value Date    WBC 5.1 02/23/2021    HGB 12.9 02/23/2021    HCT 39.5 02/23/2021    MCV 88 02/23/2021     02/23/2021     BMP:    Lab Results   Component Value Date     05/21/2021     02/23/2021     09/01/2020    K 4.0 05/21/2021    K 3.7 02/23/2021    K 4.0 09/01/2020     05/21/2021     03/17/2020     03/29/2019    CO2 27 05/21/2021    CO2 27 09/01/2020    CO2 28.0 03/17/2020    BUN 20 05/21/2021    BUN 16 09/01/2020    BUN 17 03/17/2020    CREATININE 0.8 05/21/2021    CREATININE 1.1 02/23/2021    CREATININE 1.0 12/02/2020    GLUCOSE 89 05/21/2021    GLUCOSE 84 03/17/2020    GLUCOSE 70 03/29/2019      Hepatic:   Lab Results   Component Value Date    AST 19 02/23/2021    AST 19 09/01/2020    AST 20 12/18/2019    ALT 17 02/23/2021    ALT 21 09/01/2020    ALT 21 12/18/2019    BILITOT 1.3 (H) 02/23/2021    BILITOT 1.1 09/01/2020    BILITOT 1.0 12/18/2019    ALKPHOS 49 02/23/2021    ALKPHOS 48 09/01/2020    ALKPHOS 52 12/18/2019     BNP: No results found for: BNP  Lipids: No results found for: CHOL, HDL  INR: No results found for: INR  URINE: No results found for: NAUR, PROTUR  No results found for: NITRU, COLORU, PHUR, LABCAST, WBCUA, RBCUA, MUCUS, TRICHOMONAS, YEAST, BACTERIA, CLARITYU, SPECGRAV, LEUKOCYTESUR, UROBILINOGEN, BILIRUBINUR, BLOODU, GLUCOSEU, KETUA, AMORPHOUS   Microalbumen/Creatinine ratio:  No components found for: RUCREAT        Impression/Plan:   1. 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 Goals of care include slowing rate of progression by controlling blood pressure, blood glucoses and albuminuria and by avoiding nephrotoxins such as NSAIDs and IV contrast.  2. HTN - controlled  3. DM, no microalbuminuria  4. Hypothyroidism        Bloodwork and medications were reviewed and plan of care discussed with the patient. Return to clinic in 1 year  or sooner if the need arises.       Tatyana Res, DO  [facility]

## 2021-05-25 NOTE — PATIENT INSTRUCTIONS
Can take Zyrtec, Claritin, or Allegra over the counter for your allergies. Try not to use with decongestant (D) as this will raise your blood pressure.

## 2021-08-31 ENCOUNTER — OFFICE VISIT (OUTPATIENT)
Dept: ONCOLOGY | Age: 76
End: 2021-08-31
Payer: MEDICARE

## 2021-08-31 ENCOUNTER — HOSPITAL ENCOUNTER (OUTPATIENT)
Dept: INFUSION THERAPY | Age: 76
Discharge: HOME | End: 2021-08-31
Payer: MEDICARE

## 2021-08-31 VITALS
TEMPERATURE: 99.1 F | HEART RATE: 63 BPM | RESPIRATION RATE: 16 BRPM | DIASTOLIC BLOOD PRESSURE: 72 MMHG | HEIGHT: 63 IN | WEIGHT: 150.8 LBS | SYSTOLIC BLOOD PRESSURE: 142 MMHG | BODY MASS INDEX: 26.72 KG/M2 | OXYGEN SATURATION: 97 %

## 2021-08-31 DIAGNOSIS — Z15.09 BRCA2 GENETIC CARRIER: ICD-10-CM

## 2021-08-31 DIAGNOSIS — Z15.01 BRCA2 GENETIC CARRIER: Primary | ICD-10-CM

## 2021-08-31 DIAGNOSIS — Z15.01 BRCA2 GENETIC CARRIER: ICD-10-CM

## 2021-08-31 DIAGNOSIS — Z15.09 BRCA2 GENETIC CARRIER: Primary | ICD-10-CM

## 2021-08-31 DIAGNOSIS — R92.8 OTHER ABNORMAL AND INCONCLUSIVE FINDINGS ON DIAGNOSTIC IMAGING OF BREAST: ICD-10-CM

## 2021-08-31 LAB
ABSOLUTE IMMATURE GRANULOCYTE: 0 THOU/MM3 (ref 0–0.07)
ALBUMIN SERPL-MCNC: 4.2 G/DL (ref 3.5–5.1)
ALP BLD-CCNC: 54 U/L (ref 38–126)
ALT SERPL-CCNC: 18 U/L (ref 11–66)
AST SERPL-CCNC: 18 U/L (ref 5–40)
BASINOPHIL, AUTOMATED: 0 % (ref 0–3)
BASOPHILS ABSOLUTE: 0 THOU/MM3 (ref 0–0.1)
BILIRUB SERPL-MCNC: 1.4 MG/DL (ref 0.3–1.2)
BILIRUBIN DIRECT: 0.3 MG/DL (ref 0–0.3)
BUN, WHOLE BLOOD: 14 MG/DL (ref 8–26)
CHLORIDE, WHOLE BLOOD: 103 MEQ/L (ref 98–109)
CREATININE, WHOLE BLOOD: 0.9 MG/DL (ref 0.5–1.2)
EOSINOPHILS ABSOLUTE: 0.2 THOU/MM3 (ref 0–0.4)
EOSINOPHILS RELATIVE PERCENT: 3 % (ref 0–4)
GFR, ESTIMATED ,CON: 65 ML/MIN/1.73M2
GLUCOSE, WHOLE BLOOD: 94 MG/DL (ref 70–108)
HCT VFR BLD CALC: 40.6 % (ref 37–47)
HEMOGLOBIN: 13.5 GM/DL (ref 12–16)
IMMATURE GRANULOCYTES: 0 %
IONIZED CALCIUM, WHOLE BLOOD: 1.1 MMOL/L (ref 1.12–1.32)
LYMPHOCYTES # BLD: 16 % (ref 15–47)
LYMPHOCYTES ABSOLUTE: 1.1 THOU/MM3 (ref 1–4.8)
MCH RBC QN AUTO: 29 PG (ref 26–33)
MCHC RBC AUTO-ENTMCNC: 33.3 GM/DL (ref 32.2–35.5)
MCV RBC AUTO: 87 FL (ref 81–99)
MONOCYTES ABSOLUTE: 0.4 THOU/MM3 (ref 0.4–1.3)
MONOCYTES: 6 % (ref 0–12)
PDW BLD-RTO: 15 % (ref 11.5–14.5)
PLATELET # BLD: 221 THOU/MM3 (ref 130–400)
PMV BLD AUTO: 9.9 FL (ref 9.4–12.4)
POTASSIUM, WHOLE BLOOD: 3.8 MEQ/L (ref 3.5–4.9)
RBC # BLD: 4.66 MILL/MM3 (ref 4.2–5.4)
SEG NEUTROPHILS: 75 % (ref 43–75)
SEGMENTED NEUTROPHILS ABSOLUTE COUNT: 5.1 THOU/MM3 (ref 1.8–7.7)
SODIUM, WHOLE BLOOD: 142 MEQ/L (ref 138–146)
TOTAL CO2, WHOLE BLOOD: 29 MEQ/L (ref 23–33)
TOTAL PROTEIN: 6.5 G/DL (ref 6.1–8)
WBC # BLD: 6.8 THOU/MM3 (ref 4.8–10.8)

## 2021-08-31 PROCEDURE — 99213 OFFICE O/P EST LOW 20 MIN: CPT | Performed by: INTERNAL MEDICINE

## 2021-08-31 PROCEDURE — 36415 COLL VENOUS BLD VENIPUNCTURE: CPT

## 2021-08-31 PROCEDURE — 80047 BASIC METABLC PNL IONIZED CA: CPT

## 2021-08-31 PROCEDURE — 85025 COMPLETE CBC W/AUTO DIFF WBC: CPT

## 2021-08-31 PROCEDURE — 80076 HEPATIC FUNCTION PANEL: CPT

## 2021-08-31 PROCEDURE — 99211 OFF/OP EST MAY X REQ PHY/QHP: CPT

## 2021-08-31 NOTE — PROGRESS NOTES
[facility] CANCER CENTER  CANCER NETWORK OF [facility]  [facility]OGY SPECIALISTS OF  [facility] [address] [facility] 98  [address], [facility] [address] [facility] [address]  Dept: [phone]  Dept Fax: [phone]  Loc: [phone]    Subjective:      Chief Complaint:  Monae Page is a 77 y.o. female with BRCA 2 mutation. HPI: Francesca Soni returns today for follow-up regarding a history of BRCA2 mutation. As previously noted she has underwent at her wrist directly with bilateral oophorectomy. The patient elected not to proceed with mastectomy but has been undergoing close surveillance. She recently had a mammogram completed which was unremarkable with no findings to suggest malignancy. An MRI of the breast was completed in December 2020 which was also unremarkable. On today's evaluation the patient states that she feels well has no specific complaints. She has not had fever, cough, shortness of breath or other signs of infection. The patient's bowel and bladder habits have been normal.  She has not seen blood in her stool or urine. The patient remains active with an ECOG performance status of level 0. PMH, SH, and FH:  I reviewed the patients medication list and allergy list as noted on the electronic medical record. The PMH, SH and FH were also reviewed as noted on the EMR. Review of Systems  Constitutional: Negative. HENT: Negative. Eyes: Negative. Respiratory: Negative. Cardiovascular: Negative. Gastrointestinal: Negative. Genitourinary: Negative. Musculoskeletal: Negative. Skin: Negative. Neurological: Negative. Hematological: Negative. Psychiatric/Behavioral: Negative. Objective:   Physical Exam  Vitals:    08/31/21 1012   BP: (!) 142/72   Pulse: 63   Resp: 16   Temp: 99.1 °F (37.3 °C)   SpO2: 97%   Vitals reviewed and are stable. Constitutional: Well-developed. No acute distress. HENT: Normocephalic and atraumatic.    Eyes: Pupils appear equal and reactive. Neck: Overall appearance is symmetrical. No identifiable masses. Pulmonary: Effort normal. No respiratory distress. Chest:  Bilateral breast exam displays no palpable abnormalities. Surgical incisions are well-healed. There is no evidence of any localized recurrence. Neurological: Alert and oriented to person, place, and time. Judgment and thought content normal.  Skin: Skin is warm and dry. No rash. Psychiatric: Mood and affect appropriate for the clinical situation. Data Analysis: The following studies were reviewed with the patient today:    Hematology 8/31/2021 2/23/2021 9/1/2020   WBC 6.8 5.1 5.6   RBC 4.66 4.49 4.53   HGB 13.5 12.9 12.5   HCT 40.6 39.5 38.9   MCV 87 88 86   RDW 15.0 (H) 13.5 15.2 (H)    197 213     Assessment:   1. BRCA 2 mutation. 2.  Status post hysterectomy with bilateral oophorectomy. Plan:   1. Monitor for any evidence of development of malignancy including breast evaluation. 2.  Bilateral screening MRI of the breasts prior to RTC. 3.  Continue annual mammogram.     Cj James M.D. Medical Director: [facility]  Cancer Network [address] [place] [facility], [address] [place] [address], [address] [place] [address], [address] [place] [address] of the [facility] at the [facility]      **This report has been created using voice recognition software. It may contain minor errors which are inherent in voice recognition technology. **

## 2022-02-09 DIAGNOSIS — Z15.09 BRCA2 GENETIC CARRIER: ICD-10-CM

## 2022-02-09 DIAGNOSIS — Z15.01 BRCA2 GENETIC CARRIER: ICD-10-CM

## 2022-02-09 RX ORDER — LORAZEPAM 1 MG/1
TABLET ORAL
Qty: 2 TABLET | Refills: 0 | Status: SHIPPED | OUTPATIENT
Start: 2022-02-09 | End: 2022-03-01 | Stop reason: ALTCHOICE

## 2022-03-01 ENCOUNTER — HOSPITAL ENCOUNTER (OUTPATIENT)
Dept: INFUSION THERAPY | Age: 77
Discharge: HOME | End: 2022-03-01
Payer: MEDICARE

## 2022-03-01 ENCOUNTER — OFFICE VISIT (OUTPATIENT)
Dept: ONCOLOGY | Age: 77
End: 2022-03-01
Payer: MEDICARE

## 2022-03-01 VITALS
RESPIRATION RATE: 16 BRPM | SYSTOLIC BLOOD PRESSURE: 136 MMHG | HEART RATE: 69 BPM | DIASTOLIC BLOOD PRESSURE: 67 MMHG | TEMPERATURE: 98.8 F | OXYGEN SATURATION: 97 %

## 2022-03-01 VITALS
DIASTOLIC BLOOD PRESSURE: 67 MMHG | RESPIRATION RATE: 16 BRPM | HEART RATE: 69 BPM | BODY MASS INDEX: 28.03 KG/M2 | WEIGHT: 158.2 LBS | HEIGHT: 63 IN | SYSTOLIC BLOOD PRESSURE: 136 MMHG | TEMPERATURE: 98.8 F | OXYGEN SATURATION: 97 %

## 2022-03-01 DIAGNOSIS — Z15.09 BRCA2 GENETIC CARRIER: ICD-10-CM

## 2022-03-01 DIAGNOSIS — Z12.31 VISIT FOR SCREENING MAMMOGRAM: Primary | ICD-10-CM

## 2022-03-01 DIAGNOSIS — Z15.01 BRCA2 GENETIC CARRIER: ICD-10-CM

## 2022-03-01 LAB
ABSOLUTE IMMATURE GRANULOCYTE: 0.01 THOU/MM3 (ref 0–0.07)
ALBUMIN SERPL-MCNC: 4.1 G/DL (ref 3.5–5.1)
ALP BLD-CCNC: 59 U/L (ref 38–126)
ALT SERPL-CCNC: 20 U/L (ref 11–66)
AST SERPL-CCNC: 18 U/L (ref 5–40)
BASINOPHIL, AUTOMATED: 1 % (ref 0–3)
BASOPHILS ABSOLUTE: 0 THOU/MM3 (ref 0–0.1)
BILIRUB SERPL-MCNC: 1 MG/DL (ref 0.3–1.2)
BILIRUBIN DIRECT: < 0.2 MG/DL (ref 0–0.3)
BUN, WHOLE BLOOD: 17 MG/DL (ref 8–26)
CHLORIDE, WHOLE BLOOD: 102 MEQ/L (ref 98–109)
CREATININE, WHOLE BLOOD: 0.6 MG/DL (ref 0.5–1.2)
EOSINOPHILS ABSOLUTE: 0.3 THOU/MM3 (ref 0–0.4)
EOSINOPHILS RELATIVE PERCENT: 5 % (ref 0–4)
GFR, ESTIMATED ,CON: > 90 ML/MIN/1.73M2
GLUCOSE, WHOLE BLOOD: 83 MG/DL (ref 70–108)
HCT VFR BLD CALC: 41.5 % (ref 37–47)
HEMOGLOBIN: 13.5 GM/DL (ref 12–16)
IMMATURE GRANULOCYTES: 0 %
IONIZED CALCIUM, WHOLE BLOOD: 1.14 MMOL/L (ref 1.12–1.32)
LYMPHOCYTES # BLD: 22 % (ref 15–47)
LYMPHOCYTES ABSOLUTE: 1.4 THOU/MM3 (ref 1–4.8)
MCH RBC QN AUTO: 28.5 PG (ref 26–33)
MCHC RBC AUTO-ENTMCNC: 32.5 GM/DL (ref 32.2–35.5)
MCV RBC AUTO: 88 FL (ref 81–99)
MONOCYTES ABSOLUTE: 0.6 THOU/MM3 (ref 0.4–1.3)
MONOCYTES: 9 % (ref 0–12)
PDW BLD-RTO: 13.8 % (ref 11.5–14.5)
PLATELET # BLD: 232 THOU/MM3 (ref 130–400)
PMV BLD AUTO: 10.8 FL (ref 9.4–12.4)
POTASSIUM, WHOLE BLOOD: 3.7 MEQ/L (ref 3.5–4.9)
RBC # BLD: 4.73 MILL/MM3 (ref 4.2–5.4)
SEG NEUTROPHILS: 64 % (ref 43–75)
SEGMENTED NEUTROPHILS ABSOLUTE COUNT: 4.1 THOU/MM3 (ref 1.8–7.7)
SODIUM, WHOLE BLOOD: 143 MEQ/L (ref 138–146)
TOTAL CO2, WHOLE BLOOD: 28 MEQ/L (ref 23–33)
TOTAL PROTEIN: 6.8 G/DL (ref 6.1–8)
WBC # BLD: 6.4 THOU/MM3 (ref 4.8–10.8)

## 2022-03-01 PROCEDURE — 80047 BASIC METABLC PNL IONIZED CA: CPT

## 2022-03-01 PROCEDURE — 80076 HEPATIC FUNCTION PANEL: CPT

## 2022-03-01 PROCEDURE — 85025 COMPLETE CBC W/AUTO DIFF WBC: CPT

## 2022-03-01 PROCEDURE — 99211 OFF/OP EST MAY X REQ PHY/QHP: CPT

## 2022-03-01 PROCEDURE — 99213 OFFICE O/P EST LOW 20 MIN: CPT | Performed by: INTERNAL MEDICINE

## 2022-03-01 NOTE — PROGRESS NOTES
[facility] CANCER CENTER  CANCER NETWORK OF [facility]  [facility] [address] [facility] 98  [address]  [address]  Dept: [phone]  Dept Fax: [phone]  Loc: [phone]    Subjective:      Chief Complaint:  Brown Orourke is a 77 y.o. female with BRCA 2 mutation. HPI: Emily Reyes is here today for follow-up regarding her history of being positive for genetic mutation of BRCA 2. On today's evaluation she states that her general sense of wellbeing has been stable. An MRI of both breasts were completed on February 22, 2022. This found no evidence of changes to suggest malignancy. The results of the mammogram reviewed with the patient today. She has not had fever, cough, shortness of breath or other signs of infection. The patient's bowel and bladder habits have been normal.  She has not seen blood in her stool or urine. The patient remains active with an ECOG performance status of level 0. PMH, SH, and FH:  I reviewed the patients medication list and allergy list as noted on the electronic medical record. The PMH, SH and FH were also reviewed as noted on the EMR. Review of Systems  Constitutional: Negative. HENT: Negative. Eyes: Negative. Respiratory: Negative. Cardiovascular: Negative. Gastrointestinal: Negative. Genitourinary: Negative. Musculoskeletal: Negative. Skin: Negative. Neurological: Negative. Hematological: Negative. Psychiatric/Behavioral: Negative. Objective:   Physical Exam  Vitals:    03/01/22 1003   BP: 136/67   Pulse: 69   Resp: 16   Temp: 98.8 °F (37.1 °C)   SpO2: 97%   Vitals reviewed and are stable. Constitutional: Well-developed. No acute distress. HENT: Normocephalic and atraumatic. Eyes: Pupils appear equal and reactive. Neck: Overall appearance is symmetrical. No identifiable masses. Pulmonary: Effort normal. No respiratory distress.    Chest:  Bilateral breast exam displays no palpable abnormalities. Neurological: Alert and oriented to person, place, and time. Judgment and thought content normal.  Skin: Skin is warm and dry. No rash. Psychiatric: Mood and affect appropriate for the clinical situation. Data Analysis: The following studies were reviewed with the patient today:    Hematology 3/1/2022 8/31/2021 2/23/2021   WBC 6.4 6.8 5.1   RBC 4.73 4.66 4.49   HGB 13.5 13.5 12.9   HCT 41.5 40.6 39.5   MCV 88 87 88   RDW 13.8 15.0 (H) 13.5    221 197     Assessment:   1. BRCA 2 mutation. 2.  Status post hysterectomy with bilateral oophorectomy. Plan:   1. Monitor for any evidence of development of malignancy. 2.  Continue annual screening MRI of the breasts. 3.  Continue annual mammogram.     Marilee Monday M.D. Medical Director: [facility]  Cancer Network [facility], [facility]  [address] [employer] [facility], [address] [place] [address] of the [facility] at [facility]      **This report has been created using voice recognition software. It may contain minor errors which are inherent in voice recognition technology. **

## 2022-05-16 LAB
BUN BLDV-MCNC: 16 MG/DL
CALCIUM SERPL-MCNC: 9.7 MG/DL
CHLORIDE BLD-SCNC: 101 MMOL/L
CO2: 31 MMOL/L
CREAT SERPL-MCNC: 0.8 MG/DL
CREATININE, URINE: 59.2
GFR CALCULATED: 72
GLUCOSE BLD-MCNC: 83 MG/DL
MICROALBUMIN/CREAT 24H UR: <1.2 MG/G{CREAT}
MICROALBUMIN/CREAT UR-RTO: <20
POTASSIUM SERPL-SCNC: 3.9 MMOL/L
SODIUM BLD-SCNC: 142 MMOL/L

## 2022-05-20 ENCOUNTER — OFFICE VISIT (OUTPATIENT)
Dept: NEPHROLOGY | Age: 77
End: 2022-05-20
Payer: MEDICARE

## 2022-05-20 VITALS
SYSTOLIC BLOOD PRESSURE: 138 MMHG | HEART RATE: 64 BPM | WEIGHT: 154.6 LBS | OXYGEN SATURATION: 98 % | HEIGHT: 63 IN | BODY MASS INDEX: 27.39 KG/M2 | DIASTOLIC BLOOD PRESSURE: 64 MMHG

## 2022-05-20 DIAGNOSIS — N18.2 CKD (CHRONIC KIDNEY DISEASE), STAGE II: Primary | ICD-10-CM

## 2022-05-20 DIAGNOSIS — I10 ESSENTIAL HYPERTENSION: ICD-10-CM

## 2022-05-20 PROCEDURE — 99213 OFFICE O/P EST LOW 20 MIN: CPT | Performed by: INTERNAL MEDICINE

## 2022-05-20 NOTE — PROGRESS NOTES
cyanosis  Skin: warm, dry  Neurologic: no tremor, no asterixis, no focal neurologic deficits     Medications:  Current Outpatient Medications   Medication Sig Dispense Refill    NONFORMULARY Pay a day eye drops      hydrochlorothiazide (HYDRODIURIL) 12.5 MG tablet Take 25 mg by mouth daily       Calcium Carb-Cholecalciferol (CALCIUM-VITAMIN D) 500-200 MG-UNIT per tablet Take 1 tablet by mouth 2 times daily (with meals)      levothyroxine (SYNTHROID) 112 MCG tablet Take 112 mcg by mouth Daily       atorvastatin (LIPITOR) 40 MG tablet Take 40 mg by mouth daily      losartan (COZAAR) 100 MG tablet Take 100 mg by mouth daily       No current facility-administered medications for this visit.         Laboratory & Diagnostics:  CBC:   Lab Results   Component Value Date    WBC 6.4 03/01/2022    HGB 13.5 03/01/2022    HCT 41.5 03/01/2022    MCV 88 03/01/2022     03/01/2022     BMP:    Lab Results   Component Value Date     05/16/2022     03/01/2022     08/31/2021    K 3.9 05/16/2022    K 3.7 03/01/2022    K 3.8 08/31/2021     05/16/2022     05/21/2021     03/17/2020    CO2 31 05/16/2022    CO2 27 05/21/2021    CO2 27 09/01/2020    BUN 16 05/16/2022    BUN 20 05/21/2021    BUN 16 09/01/2020    CREATININE 0.8 05/16/2022    CREATININE 0.6 03/01/2022    CREATININE 0.9 08/31/2021    GLUCOSE 83 05/16/2022    GLUCOSE 89 05/21/2021    GLUCOSE 84 03/17/2020      Hepatic:   Lab Results   Component Value Date    AST 18 03/01/2022    AST 18 08/31/2021    AST 19 02/23/2021    ALT 20 03/01/2022    ALT 18 08/31/2021    ALT 17 02/23/2021    BILITOT 1.0 03/01/2022    BILITOT 1.4 (H) 08/31/2021    BILITOT 1.3 (H) 02/23/2021    ALKPHOS 59 03/01/2022    ALKPHOS 54 08/31/2021    ALKPHOS 49 02/23/2021     BNP: No results found for: BNP  Lipids: No results found for: CHOL, HDL  INR: No results found for: INR  URINE: No results found for: NAUR, PROTUR  No results found for: LONA Sue, LABCAST, WBCUA, RBCUA, MUCUS, TRICHOMONAS, YEAST, BACTERIA, CLARITYU, SPECGRAV, LEUKOCYTESUR, UROBILINOGEN, BILIRUBINUR, BLOODU, GLUCOSEU, KETUA, AMORPHOUS   Microalbumen/Creatinine ratio:  No components found for: RUCREAT        Impression/Plan:   1. 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 Goals of care include slowing rate of progression by controlling blood pressure, blood glucoses and albuminuria and by avoiding nephrotoxins such as NSAIDs and IV contrast.  2. HTN - controlled  3. DM, no microalbuminuria. She is now off meds  4. Hypothyroidism        Bloodwork and medications were reviewed and plan of care discussed with the patient. Return to clinic in 1 year  or sooner if the need arises.       Gudelia Aguilera DO  [facility]

## 2022-11-01 ENCOUNTER — HOSPITAL ENCOUNTER (OUTPATIENT)
Dept: INFUSION THERAPY | Age: 77
Discharge: HOME | End: 2022-11-01
Payer: MEDICARE

## 2022-11-01 ENCOUNTER — OFFICE VISIT (OUTPATIENT)
Dept: ONCOLOGY | Age: 77
End: 2022-11-01
Payer: MEDICARE

## 2022-11-01 VITALS
HEART RATE: 76 BPM | BODY MASS INDEX: 27.96 KG/M2 | DIASTOLIC BLOOD PRESSURE: 65 MMHG | WEIGHT: 157.8 LBS | HEIGHT: 63 IN | RESPIRATION RATE: 16 BRPM | SYSTOLIC BLOOD PRESSURE: 137 MMHG | TEMPERATURE: 98.1 F | OXYGEN SATURATION: 94 %

## 2022-11-01 VITALS
HEART RATE: 76 BPM | DIASTOLIC BLOOD PRESSURE: 65 MMHG | SYSTOLIC BLOOD PRESSURE: 137 MMHG | TEMPERATURE: 98.1 F | RESPIRATION RATE: 16 BRPM | OXYGEN SATURATION: 94 %

## 2022-11-01 DIAGNOSIS — Z15.09 BRCA2 GENE MUTATION POSITIVE IN FEMALE: Primary | ICD-10-CM

## 2022-11-01 DIAGNOSIS — Z15.02 BRCA2 GENE MUTATION POSITIVE IN FEMALE: Primary | ICD-10-CM

## 2022-11-01 DIAGNOSIS — R92.8 OTHER ABNORMAL AND INCONCLUSIVE FINDINGS ON DIAGNOSTIC IMAGING OF BREAST: ICD-10-CM

## 2022-11-01 DIAGNOSIS — Z15.01 BRCA2 GENE MUTATION POSITIVE IN FEMALE: Primary | ICD-10-CM

## 2022-11-01 PROCEDURE — 1123F ACP DISCUSS/DSCN MKR DOCD: CPT | Performed by: INTERNAL MEDICINE

## 2022-11-01 PROCEDURE — 99214 OFFICE O/P EST MOD 30 MIN: CPT | Performed by: INTERNAL MEDICINE

## 2022-11-01 PROCEDURE — 99211 OFF/OP EST MAY X REQ PHY/QHP: CPT

## 2022-11-01 PROCEDURE — 3078F DIAST BP <80 MM HG: CPT | Performed by: INTERNAL MEDICINE

## 2022-11-01 PROCEDURE — 3074F SYST BP LT 130 MM HG: CPT | Performed by: INTERNAL MEDICINE

## 2022-11-01 RX ORDER — LEVOTHYROXINE SODIUM 100 MCG
1 TABLET ORAL DAILY
COMMUNITY
Start: 2022-08-15

## 2022-11-01 NOTE — PROGRESS NOTES
[facility] CANCER CENTER  CANCER NETWORK OF [facility]  [facility] [address] [facility]  [address]  [address]  Dept: [phone]  Dept Fax: [phone]: [phone]     Encounter Date: 2022    Primary Provider: Bola Wang MD     HPI:  Armando Byrd is a very pleasant 77 y.o. female with BRCA 2 mutation . The results of the mammogram 2022 reviewed with the patient today. She has not had fever, cough, shortness of breath or other signs of infection. The patient's bowel and bladder habits have been normal.  She has not seen blood in her stool or urine. The patient remains active with an ECOG performance status of level 0. Review of Systems  Constitutional: Negative. HENT: Negative. Eyes: Negative. Respiratory: Negative. Cardiovascular: Negative. Gastrointestinal: Negative. Genitourinary: Negative. Musculoskeletal: Negative. Skin: Negative. Neurological: Negative. Hematological: Negative. Psychiatric/Behavioral: Negative. Past Medical History   has a past medical history of CPAP (continuous positive airway pressure) dependence, DJD (degenerative joint disease), Hepatic cyst, Hiatal hernia, Hyperlipidemia, Hypertension, Hypothyroidism, and Thyroid disease. Surgical History   has a past surgical history that includes bladder repair; Total knee arthroplasty;  section (); Thyroidectomy (); Hysterectomy (); and Dental surgery (). Home Medications  has a current medication list which includes the following prescription(s): synthroid, NONFORMULARY, hydrochlorothiazide, calcium-vitamin d, levothyroxine, atorvastatin, and losartan. Allergies  No Known Allergies    Social History   reports that she has never smoked. She has never used smokeless tobacco. She reports that she does not drink alcohol and does not use drugs.     Family History  family history includes BRCA 1/2 in her sister; Breast Cancer in her sister; Colon Cancer in her mother; No Known Problems in her father; Thyroid Cancer in her sister; Thyroid Disease in her mother. Labs: Reviewed    Physical Exam  Vitals:    11/01/22 1544   BP: 137/65   Pulse: 76   Resp: 16   Temp: 98.1 °F (36.7 °C)   SpO2: 94%      General: Non-ill appearing. HEENT: NC/AT,nonicteric, perrla,eom intact, no mucosal lesions  Neck: normal thyroid, no masses  Nodes: No adenopathy  Lungs/chest: clear, no rales,rhonchi or wheezing, lung bases clear  CV: rrr, no rubs ,gallops or murmurs  Abdomen: soft, non-tender,bowel sounds normal , no palpable hepatosplenomegaly  Back: normal curvature, No midline tenderness. flanks nontender  : Not Examined  Extremities: no cyanosis,clubbing or edema. Skin: unremarkable  Neuro: A and O x 4, CN exam nonfocal, Motor- no deficits, Sensory- no deficits, gait-nl, speech- fluent, no ataxia. Assessment/Plan:   BRCA2 gene mutation positive in female     - MRI BREAST BILATERAL W WO CONTRAST; every [address] every august      Patient Instructions   Rtc  MARCH 2023 - MD or PHI  MRI and labs 2/2023     Evelin Jim MD  11/1/2022      **This report has been created using voice recognition software. It may contain minor errors which are inherent in voice recognition technology. **

## 2023-02-07 DIAGNOSIS — Z15.09 BRCA2 GENETIC CARRIER: ICD-10-CM

## 2023-02-07 DIAGNOSIS — Z15.01 BRCA2 GENETIC CARRIER: ICD-10-CM

## 2023-02-07 RX ORDER — LORAZEPAM 1 MG/1
TABLET ORAL
Qty: 2 TABLET | Refills: 0 | Status: SHIPPED | OUTPATIENT
Start: 2023-02-07 | End: 2023-09-24

## 2023-03-01 DIAGNOSIS — Z15.02 BRCA2 GENE MUTATION POSITIVE IN FEMALE: Primary | ICD-10-CM

## 2023-03-01 DIAGNOSIS — Z15.09 BRCA2 GENE MUTATION POSITIVE IN FEMALE: Primary | ICD-10-CM

## 2023-03-01 DIAGNOSIS — Z15.01 BRCA2 GENE MUTATION POSITIVE IN FEMALE: Primary | ICD-10-CM

## 2023-03-06 ENCOUNTER — OFFICE VISIT (OUTPATIENT)
Dept: ONCOLOGY | Age: 78
End: 2023-03-06
Payer: MEDICARE

## 2023-03-06 ENCOUNTER — HOSPITAL ENCOUNTER (OUTPATIENT)
Dept: INFUSION THERAPY | Age: 78
Discharge: HOME | End: 2023-03-06
Payer: MEDICARE

## 2023-03-06 VITALS
HEIGHT: 63 IN | BODY MASS INDEX: 28.77 KG/M2 | DIASTOLIC BLOOD PRESSURE: 66 MMHG | TEMPERATURE: 97.4 F | OXYGEN SATURATION: 96 % | WEIGHT: 162.4 LBS | SYSTOLIC BLOOD PRESSURE: 135 MMHG | HEART RATE: 69 BPM | RESPIRATION RATE: 20 BRPM

## 2023-03-06 VITALS
RESPIRATION RATE: 20 BRPM | OXYGEN SATURATION: 96 % | BODY MASS INDEX: 28.77 KG/M2 | HEIGHT: 63 IN | SYSTOLIC BLOOD PRESSURE: 135 MMHG | WEIGHT: 162.4 LBS | DIASTOLIC BLOOD PRESSURE: 66 MMHG | HEART RATE: 69 BPM | TEMPERATURE: 97.4 F

## 2023-03-06 DIAGNOSIS — Z15.01 BRCA2 GENE MUTATION POSITIVE IN FEMALE: ICD-10-CM

## 2023-03-06 DIAGNOSIS — Z12.31 VISIT FOR SCREENING MAMMOGRAM: ICD-10-CM

## 2023-03-06 DIAGNOSIS — Z15.09 BRCA2 GENE MUTATION POSITIVE IN FEMALE: Primary | ICD-10-CM

## 2023-03-06 DIAGNOSIS — Z15.01 BRCA2 GENE MUTATION POSITIVE IN FEMALE: Primary | ICD-10-CM

## 2023-03-06 DIAGNOSIS — Z15.09 BRCA2 GENE MUTATION POSITIVE IN FEMALE: ICD-10-CM

## 2023-03-06 DIAGNOSIS — Z15.02 BRCA2 GENE MUTATION POSITIVE IN FEMALE: Primary | ICD-10-CM

## 2023-03-06 DIAGNOSIS — R92.8 OTHER ABNORMAL AND INCONCLUSIVE FINDINGS ON DIAGNOSTIC IMAGING OF BREAST: ICD-10-CM

## 2023-03-06 DIAGNOSIS — Z15.02 BRCA2 GENE MUTATION POSITIVE IN FEMALE: ICD-10-CM

## 2023-03-06 LAB
ALBUMIN SERPL BCG-MCNC: 4.1 G/DL (ref 3.5–5.1)
ALP SERPL-CCNC: 52 U/L (ref 38–126)
ALT SERPL W/O P-5'-P-CCNC: 19 U/L (ref 11–66)
AST SERPL-CCNC: 19 U/L (ref 5–40)
BASOPHILS # BLD AUTO: 0 THOU/MM3 (ref 0–0.1)
BASOPHILS NFR BLD AUTO: 1 % (ref 0–3)
BILIRUB CONJ SERPL-MCNC: < 0.2 MG/DL (ref 0–0.3)
BILIRUB SERPL-MCNC: 1.1 MG/DL (ref 0.3–1.2)
BUN BLDP-MCNC: 16 MG/DL (ref 8–26)
CHLORIDE BLD-SCNC: 102 MEQ/L (ref 98–109)
CREAT BLD-MCNC: 0.9 MG/DL (ref 0.5–1.2)
EOSINOPHIL # BLD AUTO: 0.2 THOU/MM3 (ref 0–0.4)
EOSINOPHIL NFR BLD AUTO: 4 % (ref 0–4)
ERYTHROCYTE [DISTWIDTH] IN BLOOD BY AUTOMATED COUNT: 14.3 % (ref 11.5–14.5)
GFR SERPL CREATININE-BSD FRML MDRD: > 60 ML/MIN/1.73M2
GLUCOSE BLD-MCNC: 82 MG/DL (ref 70–108)
HCT VFR BLD AUTO: 40.4 % (ref 37–47)
HGB BLD-MCNC: 13.3 GM/DL (ref 12–16)
IMM GRANULOCYTES # BLD AUTO: 0.01 THOU/MM3 (ref 0–0.07)
IMM GRANULOCYTES NFR BLD AUTO: 0 %
IONIZED CALCIUM, WHOLE BLOOD: 1.11 MMOL/L (ref 1.12–1.32)
LYMPHOCYTES # BLD AUTO: 1.1 THOU/MM3 (ref 1–4.8)
LYMPHOCYTES NFR BLD AUTO: 19 % (ref 15–47)
MCH RBC QN AUTO: 28.5 PG (ref 26–33)
MCHC RBC AUTO-ENTMCNC: 32.9 GM/DL (ref 32.2–35.5)
MCV RBC AUTO: 87 FL (ref 81–99)
MONOCYTES # BLD AUTO: 0.5 THOU/MM3 (ref 0.4–1.3)
MONOCYTES NFR BLD AUTO: 8 % (ref 0–12)
NEUTROPHILS NFR BLD AUTO: 69 % (ref 43–75)
PLATELET # BLD AUTO: 208 THOU/MM3 (ref 130–400)
PMV BLD AUTO: 9.9 FL (ref 9.4–12.4)
POTASSIUM BLD-SCNC: 3.6 MEQ/L (ref 3.5–4.9)
PROT SERPL-MCNC: 6.5 G/DL (ref 6.1–8)
RBC # BLD AUTO: 4.66 MILL/MM3 (ref 4.2–5.4)
SEGMENTED NEUTROPHILS ABSOLUTE COUNT: 3.9 THOU/MM3 (ref 1.8–7.7)
SODIUM BLD-SCNC: 140 MEQ/L (ref 138–146)
TOTAL CO2, WHOLE BLOOD: 27 MEQ/L (ref 23–33)
WBC # BLD AUTO: 5.6 THOU/MM3 (ref 4.8–10.8)

## 2023-03-06 PROCEDURE — G8400 PT W/DXA NO RESULTS DOC: HCPCS | Performed by: NURSE PRACTITIONER

## 2023-03-06 PROCEDURE — 3078F DIAST BP <80 MM HG: CPT | Performed by: NURSE PRACTITIONER

## 2023-03-06 PROCEDURE — 1123F ACP DISCUSS/DSCN MKR DOCD: CPT | Performed by: NURSE PRACTITIONER

## 2023-03-06 PROCEDURE — 80047 BASIC METABLC PNL IONIZED CA: CPT

## 2023-03-06 PROCEDURE — 1036F TOBACCO NON-USER: CPT | Performed by: NURSE PRACTITIONER

## 2023-03-06 PROCEDURE — 80076 HEPATIC FUNCTION PANEL: CPT

## 2023-03-06 PROCEDURE — 3075F SYST BP GE 130 - 139MM HG: CPT | Performed by: NURSE PRACTITIONER

## 2023-03-06 PROCEDURE — 99211 OFF/OP EST MAY X REQ PHY/QHP: CPT

## 2023-03-06 PROCEDURE — G8484 FLU IMMUNIZE NO ADMIN: HCPCS | Performed by: NURSE PRACTITIONER

## 2023-03-06 PROCEDURE — 85025 COMPLETE CBC W/AUTO DIFF WBC: CPT

## 2023-03-06 PROCEDURE — G8417 CALC BMI ABV UP PARAM F/U: HCPCS | Performed by: NURSE PRACTITIONER

## 2023-03-06 PROCEDURE — 1090F PRES/ABSN URINE INCON ASSESS: CPT | Performed by: NURSE PRACTITIONER

## 2023-03-06 PROCEDURE — G8427 DOCREV CUR MEDS BY ELIG CLIN: HCPCS | Performed by: NURSE PRACTITIONER

## 2023-03-06 PROCEDURE — 99214 OFFICE O/P EST MOD 30 MIN: CPT | Performed by: NURSE PRACTITIONER

## 2023-03-06 NOTE — PROGRESS NOTES
[address] [address]  Dept: [phone]  Loc: [phone]       Visit Date:3/6/2023     Nancy Spring is a 69 y.o. female who presents today for:   Chief Complaint   Patient presents with    Follow-up     BRCA2 gene mutation positive in female        HPI:   Nancy Spring is a 69 y.o. female with known BRCA2 mutation. The patient also has a history of diabetes, HTN, hyperlipidemia and hypothyroidism. 05/16/2018 the patient was seen for initial consultation with Dr. Bunny Bob to establish care regarding her BRCA2 mutation on genetic testing completed in [place]. The patient underwent a hysterectomy with bilateral oophorectomy in 2006. No history of malignancy. She refused prophylactic bilateral mastectomy. She was recommended surveillance with annual MRI of the breasts and annual screening mammogram.    08/29/2022 bilateral screening mammogram results revealed no mammographic evidence of malignancy. 02/20/2023 MRI of the breast revealed no evidence of malignancy. Interval History 3/6/2023: The patient returns for follow-up on her BRCA2 mutation. CBC results reveal WBCs 5.6, Hgb 13.3, HCT 40.4% and platelet count 342,387. Patient denies any breast changes, skin changes or drainage from the nipple. No headaches, dizziness or vision changes. No chest pain or SOB. No abdominal pain or GI issues. She is maintaining a good appetite and stable weight. No fevers or night sweats. PMH, SH, and FH:  I reviewed the patients medication list and allergy list as noted on the electronic medical record. The PMH, SH and FH were also reviewed as noted on the EMR. Review of Systems:   Review of Systems   Pertinent review of systems noted in HPI, all other ROS negative.    Objective:   Physical Exam   /66 (Site: Right Upper Arm, Position: Sitting, Cuff Size: Medium Adult)   Pulse 69 Temp 97.4 °F (36.3 °C) (Oral)   Resp 20   Ht 5' 3\" (1.6 m)   Wt 162 lb 6.4 oz (73.7 kg)   SpO2 96%   BMI 28.77 kg/m²    General appearance: No apparent distress, calm and cooperative. HEENT: Pupils equal, round, and reactive to light. Conjunctivae/corneas clear. Oral mucosa intact  Neck: Supple, with full range of motion. Trachea midline. Respiratory:  Normal respiratory effort. Clear to auscultation, bilaterally without Rales/Wheezes/Rhonchi. Cardiovascular: Regular rate and rhythm with normal S1/S2 without murmurs, rubs or gallops. Breasts: No masses or dimpling, no nipple discharge or inversion, no redness or erythema   Abdomen: Soft, non-tender, non-distended with active bowel sounds. Musculoskeletal: No clubbing, cyanosis or edema bilaterally. Skin: Skin color, texture, turgor normal.  No visible rashes or lesions. Neurologic:  Neurovascularly intact without any focal sensory/motor deficits. Psychiatric: Alert and oriented, thought content appropriate, normal insight  Capillary Refill: Brisk,< 3 seconds   Peripheral Pulses: +2 palpable, equal bilaterally       Imaging Studies and Labs:   CBC:   Lab Results   Component Value Date    WBC 5.6 03/06/2023    HGB 13.3 03/06/2023    HCT 40.4 03/06/2023    MCV 87 03/06/2023     03/06/2023     BMP:   Lab Results   Component Value Date/Time     03/06/2023 10:05 AM     05/16/2022 12:00 AM    K 3.6 03/06/2023 10:05 AM    K 3.9 05/16/2022 12:00 AM     05/16/2022 12:00 AM    CO2 31 05/16/2022 12:00 AM    BUN 16 05/16/2022 12:00 AM    CREATININE 0.9 03/06/2023 10:05 AM    CREATININE 0.8 05/16/2022 12:00 AM    GLUCOSE 83 05/16/2022 12:00 AM    CALCIUM 9.7 05/16/2022 12:00 AM      LFT:   Lab Results   Component Value Date    ALT 20 03/01/2022    AST 18 03/01/2022    ALKPHOS 59 03/01/2022    BILITOT 1.0 03/01/2022         Assessment and Plan:   1.  BRCA2 gene mutation positive in female  Continue monthly SBE  - CBC with Auto Differential; Future  - Hepatic Function Panel; Future  - POC PANEL BMP W/IOCA; Future    2. Visit for screening mammogram  - BELKYS DIGITAL SCREEN W OR WO CAD BILATERAL; Future    3. Other abnormal and inconclusive findings on diagnostic imaging of breast   - MRI BREAST BILATERAL W WO CONTRAST; Future    Return in about 1 year (around 3/6/2024). 30 minutes spent face-to-face with the patient. Greater than 50% of time was spent on counseling and coordinating care. All patient questions answered. Pt voiced understanding. Patient agreed with treatment plan. Follow up as directed. Patient instructed to call for questions or concerns.        Electronically signed by   BALDO Murray - NICHOLAS

## 2023-03-06 NOTE — PATIENT INSTRUCTIONS
Screening mammogram at [facility] 08/30/2023  MRI of the breast at [facility] 02/21/2024  Return to clinic for follow up in one year  Notify the office of any new or worsening symptoms

## 2023-05-11 LAB
BUN BLDV-MCNC: 20 MG/DL
CALCIUM SERPL-MCNC: 9.5 MG/DL
CHLORIDE BLD-SCNC: 103 MMOL/L
CO2: 28 MMOL/L
CREAT SERPL-MCNC: 0.8 MG/DL
EGFR: 71
GLUCOSE BLD-MCNC: 88 MG/DL
POTASSIUM SERPL-SCNC: 4.1 MMOL/L
SODIUM BLD-SCNC: 142 MMOL/L

## 2023-05-19 ENCOUNTER — OFFICE VISIT (OUTPATIENT)
Dept: NEPHROLOGY | Age: 78
End: 2023-05-19
Payer: MEDICARE

## 2023-05-19 VITALS
BODY MASS INDEX: 28.17 KG/M2 | DIASTOLIC BLOOD PRESSURE: 62 MMHG | HEART RATE: 80 BPM | HEIGHT: 63 IN | WEIGHT: 159 LBS | SYSTOLIC BLOOD PRESSURE: 124 MMHG | OXYGEN SATURATION: 98 %

## 2023-05-19 DIAGNOSIS — N18.2 CKD (CHRONIC KIDNEY DISEASE), STAGE II: Primary | ICD-10-CM

## 2023-05-19 DIAGNOSIS — I10 ESSENTIAL HYPERTENSION: ICD-10-CM

## 2023-05-19 PROCEDURE — 3074F SYST BP LT 130 MM HG: CPT | Performed by: INTERNAL MEDICINE

## 2023-05-19 PROCEDURE — G8427 DOCREV CUR MEDS BY ELIG CLIN: HCPCS | Performed by: INTERNAL MEDICINE

## 2023-05-19 PROCEDURE — 99213 OFFICE O/P EST LOW 20 MIN: CPT | Performed by: INTERNAL MEDICINE

## 2023-05-19 PROCEDURE — 3078F DIAST BP <80 MM HG: CPT | Performed by: INTERNAL MEDICINE

## 2023-05-19 PROCEDURE — 1090F PRES/ABSN URINE INCON ASSESS: CPT | Performed by: INTERNAL MEDICINE

## 2023-05-19 PROCEDURE — 1123F ACP DISCUSS/DSCN MKR DOCD: CPT | Performed by: INTERNAL MEDICINE

## 2023-05-19 PROCEDURE — G8417 CALC BMI ABV UP PARAM F/U: HCPCS | Performed by: INTERNAL MEDICINE

## 2023-05-19 PROCEDURE — G8400 PT W/DXA NO RESULTS DOC: HCPCS | Performed by: INTERNAL MEDICINE

## 2023-05-19 PROCEDURE — 1036F TOBACCO NON-USER: CPT | Performed by: INTERNAL MEDICINE

## 2023-05-19 NOTE — PROGRESS NOTES
[facility]    [facility], [address]   [place] [facility]  [phone]  Progress Note  5/19/2023 10:00 AM      Pt Name:    Slim Herring  Birthdate:    [birth year]  Primary Care Physician:  Sina Ching MD     Chief Complaint:   Chief Complaint   Patient presents with    Follow-up     CKD II        History of Present Illness: This is a follow-up visit for CKD 2. Additional history includes DM, hypertension, hyperlipidemia, hypothyroidism. + BRCA mutation but no personal history of cancer. Feeling ok. BP controlled. No new events. She has some left sided low back pain after gardening yesterday. She was concerned it is her pancreas. Pertinent items are noted in HPI. Past History:  Past Medical History:   Diagnosis Date    CPAP (continuous positive airway pressure) dependence     DJD (degenerative joint disease)     Hepatic cyst     Hiatal hernia     Hyperlipidemia     Hypertension     Hypothyroidism     Thyroid disease      Past Surgical History:   Procedure Laterality Date    [address]  2008    HYSTERECTOMY (CERVIX STATUS UNKNOWN)  2006    THYROIDECTOMY  1980    and 2004    TOTAL KNEE ARTHROPLASTY          VITALS:  Ht 5' 3\" (1.6 m)   Wt 159 lb (72.1 kg)   BMI 28.17 kg/m²   Wt Readings from Last 3 Encounters:   05/19/23 159 lb (72.1 kg)   03/06/23 162 lb 6.4 oz (73.7 kg)   03/06/23 162 lb 6.4 oz (73.7 kg)     Body mass index is 28.17 kg/m².      General Appearance: alert and cooperative with exam, appears comfortable, no distress  HEENT: EOMI, moist oral mucus membranes  Neck: No jugular venous distention,   Lungs: Air entry B/L, no crackles or rales, no use of accessory muscles  Heart: S1, S2 heard, no rub  GI: soft, non-tender, no guarding, no flank pain  Extremities: No sig LE edema, no cyanosis  Skin: warm, dry  Neurologic: no tremor, no asterixis     Medications:  Current Outpatient Medications

## 2023-06-02 DIAGNOSIS — Z15.02 BRCA2 GENE MUTATION POSITIVE IN FEMALE: Primary | ICD-10-CM

## 2023-06-02 DIAGNOSIS — Z15.01 BRCA2 GENE MUTATION POSITIVE IN FEMALE: Primary | ICD-10-CM

## 2023-06-02 DIAGNOSIS — Z15.09 BRCA2 GENE MUTATION POSITIVE IN FEMALE: Primary | ICD-10-CM

## 2024-03-14 DIAGNOSIS — Z15.09 BRCA2 GENETIC CARRIER: ICD-10-CM

## 2024-03-14 DIAGNOSIS — Z15.01 BRCA2 GENETIC CARRIER: ICD-10-CM

## 2024-03-18 RX ORDER — LORAZEPAM 1 MG/1
TABLET ORAL
Qty: 2 TABLET | Refills: 0 | Status: SHIPPED | OUTPATIENT
Start: 2024-03-18 | End: 2024-10-29

## 2024-04-04 ENCOUNTER — OFFICE VISIT (OUTPATIENT)
Dept: ONCOLOGY | Age: 79
End: 2024-04-04
Payer: MEDICARE

## 2024-04-04 ENCOUNTER — HOSPITAL ENCOUNTER (OUTPATIENT)
Dept: INFUSION THERAPY | Age: 79
Discharge: HOME | End: 2024-04-04
Payer: MEDICARE

## 2024-04-04 VITALS
BODY MASS INDEX: 27.64 KG/M2 | HEART RATE: 77 BPM | DIASTOLIC BLOOD PRESSURE: 77 MMHG | TEMPERATURE: 98.8 F | SYSTOLIC BLOOD PRESSURE: 145 MMHG | WEIGHT: 156 LBS | HEIGHT: 63 IN | OXYGEN SATURATION: 98 % | RESPIRATION RATE: 18 BRPM

## 2024-04-04 VITALS
HEART RATE: 77 BPM | DIASTOLIC BLOOD PRESSURE: 77 MMHG | RESPIRATION RATE: 18 BRPM | OXYGEN SATURATION: 98 % | TEMPERATURE: 98.8 F | SYSTOLIC BLOOD PRESSURE: 145 MMHG

## 2024-04-04 DIAGNOSIS — K21.00 GASTROESOPHAGEAL REFLUX DISEASE WITH ESOPHAGITIS WITHOUT HEMORRHAGE: ICD-10-CM

## 2024-04-04 DIAGNOSIS — Z15.09 BRCA2 GENE MUTATION POSITIVE IN FEMALE: Primary | ICD-10-CM

## 2024-04-04 DIAGNOSIS — Z12.31 ENCOUNTER FOR SCREENING MAMMOGRAM FOR MALIGNANT NEOPLASM OF BREAST: ICD-10-CM

## 2024-04-04 DIAGNOSIS — Z15.01 BRCA2 GENE MUTATION POSITIVE IN FEMALE: Primary | ICD-10-CM

## 2024-04-04 DIAGNOSIS — N64.59 OTHER SIGNS AND SYMPTOMS IN BREAST: ICD-10-CM

## 2024-04-04 DIAGNOSIS — Z15.02 BRCA2 GENE MUTATION POSITIVE IN FEMALE: Primary | ICD-10-CM

## 2024-04-04 PROCEDURE — 99215 OFFICE O/P EST HI 40 MIN: CPT | Performed by: NURSE PRACTITIONER

## 2024-04-04 PROCEDURE — G8427 DOCREV CUR MEDS BY ELIG CLIN: HCPCS | Performed by: NURSE PRACTITIONER

## 2024-04-04 PROCEDURE — G8417 CALC BMI ABV UP PARAM F/U: HCPCS | Performed by: NURSE PRACTITIONER

## 2024-04-04 PROCEDURE — 1090F PRES/ABSN URINE INCON ASSESS: CPT | Performed by: NURSE PRACTITIONER

## 2024-04-04 PROCEDURE — 99211 OFF/OP EST MAY X REQ PHY/QHP: CPT

## 2024-04-04 PROCEDURE — 1123F ACP DISCUSS/DSCN MKR DOCD: CPT | Performed by: NURSE PRACTITIONER

## 2024-04-04 PROCEDURE — 3077F SYST BP >= 140 MM HG: CPT | Performed by: NURSE PRACTITIONER

## 2024-04-04 PROCEDURE — G8399 PT W/DXA RESULTS DOCUMENT: HCPCS | Performed by: NURSE PRACTITIONER

## 2024-04-04 PROCEDURE — 3078F DIAST BP <80 MM HG: CPT | Performed by: NURSE PRACTITIONER

## 2024-04-04 PROCEDURE — 1036F TOBACCO NON-USER: CPT | Performed by: NURSE PRACTITIONER

## 2024-04-04 RX ORDER — OLOPATADINE HYDROCHLORIDE 2 MG/ML
1 SOLUTION/ DROPS OPHTHALMIC DAILY
COMMUNITY

## 2024-04-04 NOTE — PATIENT INSTRUCTIONS
Follow up with Dr. Nixon  [facility]  Follow up with MD in one year  Notify the office of any new or worsening symptoms

## 2024-04-04 NOTE — PROGRESS NOTES
19-9 level to monitor for pancreatic cancer.  She was recommended follow-up evaluation with her GI provider and possible EGD evaluation for her history of reflux and hiatal hernia.  The patient verbalized an understanding and plans to follow-up with GI to schedule an appointment.  She previously requested a  level be done due to ongoing abdominal pain. She was recommended a proper workup for evaluation of the abdominal pain, but reports symptoms resolved after treating her constipation.  She was recommended treatment with MiraLAX daily.  She denies any abnormal bleeding; no epistaxis, gingival bleeding, hemoptysis, hematemesis, hematuria, hematochezia, melena.  No fevers or drenching night sweats.  She is maintaining a good appetite and stable weight.  She is planning a series of trips to [facility] end of this month, [place] in June and a boat trip down the [place] the end of September to early October.      PMH, SH, and FH:  I reviewed the patients medication list and allergy list as noted on the electronic medical record. The PMH, SH and FH were also reviewed as noted on the EMR.      Review of Systems:   Review of Systems   Pertinent review of systems noted in HPI, all other ROS negative.   Objective:   Physical Exam   BP (!) 145/77 (Site: Right Upper Arm, Position: Sitting, Cuff Size: Medium Adult)   Pulse 77   Temp 98.8 °F (37.1 °C) (Oral)   Resp 18   Ht 1.6 m (5' 3\")   Wt 70.8 kg (156 lb)   SpO2 98%   BMI 27.63 kg/m²    General appearance: No apparent distress, calm and cooperative.  HEENT: Pupils equal, round, and reactive to light. Conjunctivae/corneas clear. Oral mucosa intact  Neck: Supple, with full range of motion. Trachea midline.   Respiratory:  Normal respiratory effort. Clear to auscultation, bilaterally without Rales/Wheezes/Rhonchi.  Cardiovascular: Regular rate and rhythm with normal S1/S2 without murmurs, rubs or gallops.   Abdomen: Soft, non-tender, non-distended with active

## 2024-05-30 DIAGNOSIS — N18.2 CKD (CHRONIC KIDNEY DISEASE), STAGE II: Primary | ICD-10-CM

## 2024-05-30 DIAGNOSIS — I10 ESSENTIAL HYPERTENSION: ICD-10-CM

## 2024-05-31 LAB
BUN BLDV-MCNC: 14 MG/DL
CALCIUM SERPL-MCNC: 9.7 MG/DL
CHLORIDE BLD-SCNC: 101 MMOL/L
CO2: 31 MMOL/L
CREAT SERPL-MCNC: 0.8 MG/DL
CREATININE URINE: 20.6 MG/DL
EGFR: 71
GLUCOSE BLD-MCNC: 90 MG/DL
MICROALBUMIN/CREAT 24H UR: <1.2 MG/DL
MICROALBUMIN/CREAT UR-RTO: <58 MG/G
POTASSIUM SERPL-SCNC: 3.7 MMOL/L
SODIUM BLD-SCNC: 141 MMOL/L

## 2024-06-07 ENCOUNTER — OFFICE VISIT (OUTPATIENT)
Dept: NEPHROLOGY | Age: 79
End: 2024-06-07
Payer: MEDICARE

## 2024-06-07 VITALS
SYSTOLIC BLOOD PRESSURE: 120 MMHG | OXYGEN SATURATION: 98 % | WEIGHT: 152 LBS | HEART RATE: 76 BPM | DIASTOLIC BLOOD PRESSURE: 66 MMHG | BODY MASS INDEX: 26.93 KG/M2

## 2024-06-07 DIAGNOSIS — N18.2 CKD (CHRONIC KIDNEY DISEASE), STAGE II: Primary | ICD-10-CM

## 2024-06-07 PROCEDURE — G8427 DOCREV CUR MEDS BY ELIG CLIN: HCPCS | Performed by: INTERNAL MEDICINE

## 2024-06-07 PROCEDURE — G8417 CALC BMI ABV UP PARAM F/U: HCPCS | Performed by: INTERNAL MEDICINE

## 2024-06-07 PROCEDURE — 99214 OFFICE O/P EST MOD 30 MIN: CPT | Performed by: INTERNAL MEDICINE

## 2024-06-07 PROCEDURE — 3074F SYST BP LT 130 MM HG: CPT | Performed by: INTERNAL MEDICINE

## 2024-06-07 PROCEDURE — 3078F DIAST BP <80 MM HG: CPT | Performed by: INTERNAL MEDICINE

## 2024-06-07 PROCEDURE — G8399 PT W/DXA RESULTS DOCUMENT: HCPCS | Performed by: INTERNAL MEDICINE

## 2024-06-07 PROCEDURE — 1123F ACP DISCUSS/DSCN MKR DOCD: CPT | Performed by: INTERNAL MEDICINE

## 2024-06-07 PROCEDURE — 1090F PRES/ABSN URINE INCON ASSESS: CPT | Performed by: INTERNAL MEDICINE

## 2024-06-07 PROCEDURE — 1036F TOBACCO NON-USER: CPT | Performed by: INTERNAL MEDICINE

## 2024-06-07 NOTE — PROGRESS NOTES
[facility]    [address] [place] [address], [address]   [place] [address]  [phone]  Progress Note  2024 10:00 AM      Pt Name:    Vicenta Gay  YOB: 1945  Primary Care Physician:  Damián Devries MD     Chief Complaint:   Chief Complaint   Patient presents with    Follow-up     CKD III        History of Present Illness:   This is a follow-up visit for CKD 2.  Additional history includes DM, hypertension, hyperlipidemia, hypothyroidism. + BRCA mutation but no personal history of cancer.     Doing well.   Had knee replacement a few months ago.   Bp controlled.         Pertinent items are noted in HPI.         Past History:  Past Medical History:   Diagnosis Date    CPAP (continuous positive airway pressure) dependence     DJD (degenerative joint disease)     Hepatic cyst     Hiatal hernia     Hyperlipidemia     Hypertension     Hypothyroidism     Thyroid disease      Past Surgical History:   Procedure Laterality Date    BLADDER REPAIR       SECTION      DENTAL SURGERY      HYSTERECTOMY (CERVIX STATUS UNKNOWN)  2006    KNEE SURGERY Right 2024    THYROIDECTOMY      and     TOTAL KNEE ARTHROPLASTY          VITALS:  /66 (Site: Right Upper Arm, Position: Sitting, Cuff Size: Large Adult)   Pulse 76   Wt 68.9 kg (152 lb)   SpO2 98%   BMI 26.93 kg/m²   Wt Readings from Last 3 Encounters:   24 68.9 kg (152 lb)   24 70.8 kg (156 lb)   23 72.1 kg (159 lb)     Body mass index is 26.93 kg/m².     General Appearance: alert and cooperative with exam, appears comfortable, no distress  HEENT: EOMI, moist oral mucus membranes  Neck: No jugular venous distention,   Lungs: Air entry B/L, no crackles or rales, no use of accessory muscles  Heart: S1, S2 heard, no rub  GI: soft, non-tender, no guarding, no flank pain  Extremities: No sig LE edema, no cyanosis  Skin: warm, dry  Neurologic: no tremor, no asterixis     Medications:  Current

## 2025-03-20 DIAGNOSIS — Z15.01 BRCA2 GENETIC CARRIER: ICD-10-CM

## 2025-03-20 DIAGNOSIS — Z15.09 BRCA2 GENETIC CARRIER: ICD-10-CM

## 2025-03-20 RX ORDER — LORAZEPAM 1 MG/1
TABLET ORAL
Qty: 2 TABLET | Refills: 0 | Status: SHIPPED | OUTPATIENT
Start: 2025-03-20 | End: 2025-11-04

## 2025-04-01 ENCOUNTER — PATIENT MESSAGE (OUTPATIENT)
Dept: ONCOLOGY | Age: 80
End: 2025-04-01

## 2025-04-01 NOTE — TELEPHONE ENCOUNTER
Vicenta Gay would like to cancel the following appointments:     Dr. Magi Ortiz MD in [facility] ([phone]), 4/3/2025 11:30 AM     Comments:

## 2025-04-07 ENCOUNTER — HOSPITAL ENCOUNTER (OUTPATIENT)
Dept: INFUSION THERAPY | Age: 80
Discharge: HOME | End: 2025-04-07
Payer: MEDICARE

## 2025-04-07 ENCOUNTER — TELEMEDICINE (OUTPATIENT)
Dept: ONCOLOGY | Age: 80
End: 2025-04-07

## 2025-04-07 VITALS
SYSTOLIC BLOOD PRESSURE: 138 MMHG | WEIGHT: 159.8 LBS | HEIGHT: 63 IN | TEMPERATURE: 97.9 F | BODY MASS INDEX: 28.31 KG/M2 | OXYGEN SATURATION: 98 % | HEART RATE: 76 BPM | DIASTOLIC BLOOD PRESSURE: 63 MMHG | RESPIRATION RATE: 16 BRPM

## 2025-04-07 VITALS
OXYGEN SATURATION: 98 % | TEMPERATURE: 97.9 F | HEART RATE: 76 BPM | DIASTOLIC BLOOD PRESSURE: 63 MMHG | RESPIRATION RATE: 16 BRPM | SYSTOLIC BLOOD PRESSURE: 138 MMHG

## 2025-04-07 DIAGNOSIS — M54.50 MIDLINE LOW BACK PAIN, UNSPECIFIED CHRONICITY, UNSPECIFIED WHETHER SCIATICA PRESENT: ICD-10-CM

## 2025-04-07 DIAGNOSIS — Z15.01 BRCA2 GENETIC CARRIER: Primary | ICD-10-CM

## 2025-04-07 DIAGNOSIS — Z15.01 BRCA2 GENETIC CARRIER: ICD-10-CM

## 2025-04-07 DIAGNOSIS — Z15.09 BRCA2 GENETIC CARRIER: Primary | ICD-10-CM

## 2025-04-07 DIAGNOSIS — Z15.09 BRCA2 GENETIC CARRIER: ICD-10-CM

## 2025-04-07 LAB
ALBUMIN SERPL BCG-MCNC: 4 G/DL (ref 3.4–4.9)
ALP SERPL-CCNC: 63 U/L (ref 38–126)
ALT SERPL W/O P-5'-P-CCNC: 19 U/L (ref 10–35)
AST SERPL-CCNC: 20 U/L (ref 10–35)
BASOPHILS # BLD AUTO: 0 THOU/MM3 (ref 0–0.1)
BASOPHILS NFR BLD AUTO: 0 % (ref 0–3)
BILIRUB CONJ SERPL-MCNC: 0.3 MG/DL (ref 0–0.2)
BILIRUB SERPL-MCNC: 0.8 MG/DL (ref 0.3–1.2)
BUN BLDP-MCNC: 21 MG/DL (ref 8–26)
CHLORIDE BLD-SCNC: 100 MEQ/L (ref 98–109)
CREAT BLD-MCNC: 0.9 MG/DL (ref 0.5–1.2)
EOSINOPHIL # BLD AUTO: 0.3 THOU/MM3 (ref 0–0.4)
EOSINOPHIL NFR BLD AUTO: 4 % (ref 0–4)
ERYTHROCYTE [DISTWIDTH] IN BLOOD BY AUTOMATED COUNT: 13.8 % (ref 11.5–14.5)
GFR SERPL CREATININE-BSD FRML MDRD: 65 ML/MIN/1.73M2
GLUCOSE BLD-MCNC: 154 MG/DL (ref 70–108)
HCT VFR BLD AUTO: 39.7 % (ref 37–47)
HGB BLD-MCNC: 12.9 GM/DL (ref 12–16)
IMM GRANULOCYTES # BLD AUTO: 0 THOU/MM3 (ref 0–0.07)
IMM GRANULOCYTES NFR BLD AUTO: 0 %
IONIZED CALCIUM, WHOLE BLOOD: 1.2 MMOL/L (ref 1.12–1.32)
LYMPHOCYTES # BLD AUTO: 1.3 THOU/MM3 (ref 1–4.8)
LYMPHOCYTES NFR BLD AUTO: 19 % (ref 15–47)
MCH RBC QN AUTO: 28.5 PG (ref 26–33)
MCHC RBC AUTO-ENTMCNC: 32.5 GM/DL (ref 32.2–35.5)
MCV RBC AUTO: 88 FL (ref 81–99)
MONOCYTES # BLD AUTO: 0.5 THOU/MM3 (ref 0.4–1.3)
MONOCYTES NFR BLD AUTO: 8 % (ref 0–12)
NEUTROPHILS ABSOLUTE: 4.6 THOU/MM3 (ref 1.8–7.7)
NEUTROPHILS NFR BLD AUTO: 69 % (ref 43–75)
PLATELET # BLD AUTO: 214 THOU/MM3 (ref 130–400)
PMV BLD AUTO: 10.2 FL (ref 9.4–12.4)
POTASSIUM BLD-SCNC: 3.1 MEQ/L (ref 3.5–4.9)
PROT SERPL-MCNC: 6.5 G/DL (ref 6.4–8.3)
RBC # BLD AUTO: 4.52 MILL/MM3 (ref 4.2–5.4)
SODIUM BLD-SCNC: 142 MEQ/L (ref 138–146)
TOTAL CO2, WHOLE BLOOD: 30 MEQ/L (ref 23–33)
WBC # BLD AUTO: 6.7 THOU/MM3 (ref 4.8–10.8)

## 2025-04-07 PROCEDURE — 85025 COMPLETE CBC W/AUTO DIFF WBC: CPT

## 2025-04-07 PROCEDURE — 80076 HEPATIC FUNCTION PANEL: CPT

## 2025-04-07 PROCEDURE — 99211 OFF/OP EST MAY X REQ PHY/QHP: CPT

## 2025-04-07 PROCEDURE — 80047 BASIC METABLC PNL IONIZED CA: CPT

## 2025-04-07 RX ORDER — POTASSIUM CHLORIDE 750 MG/1
10 TABLET, EXTENDED RELEASE ORAL 2 TIMES DAILY
Qty: 6 TABLET | Refills: 0 | Status: SHIPPED | OUTPATIENT
Start: 2025-04-07 | End: 2025-04-10

## 2025-04-22 ENCOUNTER — TELEPHONE (OUTPATIENT)
Dept: ONCOLOGY | Age: 80
End: 2025-04-22

## 2025-04-22 DIAGNOSIS — Z15.01 BRCA2 GENETIC CARRIER: Primary | ICD-10-CM

## 2025-04-22 DIAGNOSIS — Z15.09 BRCA2 GENETIC CARRIER: Primary | ICD-10-CM

## 2025-04-22 RX ORDER — LORAZEPAM 0.5 MG/1
0.5 TABLET ORAL ONCE
Qty: 1 TABLET | Refills: 0 | Status: SHIPPED | OUTPATIENT
Start: 2025-04-22 | End: 2025-04-22

## 2025-04-22 NOTE — TELEPHONE ENCOUNTER
Patient said her hip and back are getting better and she only wants the CT ab/pelvis and she said she is allergic to the contrast. Plus, she wants to know if she can have ativan for the scans. She called and cancelled all the other scans at [facility]

## 2025-05-08 ENCOUNTER — TELEMEDICINE (OUTPATIENT)
Dept: ONCOLOGY | Age: 80
End: 2025-05-08

## 2025-05-08 ENCOUNTER — HOSPITAL ENCOUNTER (OUTPATIENT)
Dept: INFUSION THERAPY | Age: 80
Discharge: HOME | End: 2025-05-08
Payer: MEDICARE

## 2025-05-08 VITALS
OXYGEN SATURATION: 95 % | DIASTOLIC BLOOD PRESSURE: 63 MMHG | HEART RATE: 67 BPM | WEIGHT: 159 LBS | SYSTOLIC BLOOD PRESSURE: 137 MMHG | HEIGHT: 63 IN | BODY MASS INDEX: 28.17 KG/M2 | RESPIRATION RATE: 16 BRPM

## 2025-05-08 VITALS
WEIGHT: 159 LBS | OXYGEN SATURATION: 95 % | HEART RATE: 67 BPM | HEIGHT: 63 IN | BODY MASS INDEX: 28.17 KG/M2 | SYSTOLIC BLOOD PRESSURE: 137 MMHG | DIASTOLIC BLOOD PRESSURE: 63 MMHG | RESPIRATION RATE: 16 BRPM

## 2025-05-08 DIAGNOSIS — Z12.31 ENCOUNTER FOR SCREENING MAMMOGRAM FOR MALIGNANT NEOPLASM OF BREAST: Primary | ICD-10-CM

## 2025-05-08 DIAGNOSIS — Z15.01 BRCA2 GENETIC CARRIER: ICD-10-CM

## 2025-05-08 DIAGNOSIS — Z85.3 PERSONAL HISTORY OF MALIGNANT NEOPLASM OF BREAST: ICD-10-CM

## 2025-05-08 DIAGNOSIS — Z15.09 BRCA2 GENETIC CARRIER: ICD-10-CM

## 2025-05-08 PROCEDURE — 99211 OFF/OP EST MAY X REQ PHY/QHP: CPT

## 2025-05-08 NOTE — PATIENT INSTRUCTIONS
Mammogram to be scheduled for September 2025  MRI breast March 2026  Follow-up after MRI breast 3/2026

## 2025-05-08 NOTE — PROGRESS NOTES
consent. Patient identification was verified, and a caregiver was present when appropriate.   The patient was located at Facility (Appt Department): [address] [place], [place] [id number]  Provider was located at Other: [place]  Confirm you are appropriately licensed, registered, or certified to deliver care in the state where the patient is located as indicated above. If you are not or unsure, please re-schedule the visit: Yes, I confirm.      Total time spent for this encounter: 30 minutes    --Magi Ortiz MD on 5/10/2025 at 9:54 AM    An electronic signature was used to authenticate this note.

## 2025-06-02 LAB
BUN BLDV-MCNC: 15 MG/DL
CALCIUM SERPL-MCNC: 9.7 MG/DL
CHLORIDE BLD-SCNC: 101 MMOL/L
CO2: 31 MMOL/L
CREAT SERPL-MCNC: 0.7 MG/DL
CREATININE URINE: 24.3 MG/DL
EGFR: 83
GLUCOSE BLD-MCNC: 79 MG/DL
MICROALBUMIN/CREAT 24H UR: <1.2 MG/DL
MICROALBUMIN/CREAT UR-RTO: <49 MG/G
POTASSIUM SERPL-SCNC: 4.1 MMOL/L
SODIUM BLD-SCNC: 141 MMOL/L

## 2025-06-06 ENCOUNTER — OFFICE VISIT (OUTPATIENT)
Dept: NEPHROLOGY | Age: 80
End: 2025-06-06
Payer: MEDICARE

## 2025-06-06 VITALS
SYSTOLIC BLOOD PRESSURE: 122 MMHG | DIASTOLIC BLOOD PRESSURE: 68 MMHG | HEART RATE: 68 BPM | BODY MASS INDEX: 27.81 KG/M2 | OXYGEN SATURATION: 98 % | WEIGHT: 157 LBS

## 2025-06-06 DIAGNOSIS — N18.2 CKD (CHRONIC KIDNEY DISEASE), STAGE II: Primary | ICD-10-CM

## 2025-06-06 PROCEDURE — 3078F DIAST BP <80 MM HG: CPT | Performed by: INTERNAL MEDICINE

## 2025-06-06 PROCEDURE — 99214 OFFICE O/P EST MOD 30 MIN: CPT | Performed by: INTERNAL MEDICINE

## 2025-06-06 PROCEDURE — 1036F TOBACCO NON-USER: CPT | Performed by: INTERNAL MEDICINE

## 2025-06-06 PROCEDURE — 1159F MED LIST DOCD IN RCRD: CPT | Performed by: INTERNAL MEDICINE

## 2025-06-06 PROCEDURE — 1123F ACP DISCUSS/DSCN MKR DOCD: CPT | Performed by: INTERNAL MEDICINE

## 2025-06-06 PROCEDURE — 1160F RVW MEDS BY RX/DR IN RCRD: CPT | Performed by: INTERNAL MEDICINE

## 2025-06-06 PROCEDURE — 1090F PRES/ABSN URINE INCON ASSESS: CPT | Performed by: INTERNAL MEDICINE

## 2025-06-06 PROCEDURE — G8399 PT W/DXA RESULTS DOCUMENT: HCPCS | Performed by: INTERNAL MEDICINE

## 2025-06-06 PROCEDURE — G8427 DOCREV CUR MEDS BY ELIG CLIN: HCPCS | Performed by: INTERNAL MEDICINE

## 2025-06-06 PROCEDURE — 3074F SYST BP LT 130 MM HG: CPT | Performed by: INTERNAL MEDICINE

## 2025-06-06 PROCEDURE — G8417 CALC BMI ABV UP PARAM F/U: HCPCS | Performed by: INTERNAL MEDICINE

## 2025-06-06 NOTE — PROGRESS NOTES
[facility]    [address] [place] [address], [address]   [place] [address]  [phone]  Progress Note  2025 10:00 AM      Pt Name:    Vicenta Gay  YOB: 1945  Primary Care Physician:  Damián Devries MD     Chief Complaint:   Chief Complaint   Patient presents with    Follow-up     CKD II        History of Present Illness:   This is a follow-up visit for CKD 2.  Additional history includes DM, hypertension, hyperlipidemia, hypothyroidism. + BRCA mutation but no personal history of cancer.     She feels well.   Bp controlled.   Her K was low a few months ago but 4.1 now.   On hctz.           Pertinent items are noted in HPI.         Past History:  Past Medical History:   Diagnosis Date    CPAP (continuous positive airway pressure) dependence     DJD (degenerative joint disease)     Hepatic cyst     Hiatal hernia     Hyperlipidemia     Hypertension     Hypothyroidism     Thyroid disease      Past Surgical History:   Procedure Laterality Date    BLADDER REPAIR       SECTION      DENTAL SURGERY      HYSTERECTOMY (CERVIX STATUS UNKNOWN)  2006    KNEE SURGERY Right 2024    THYROIDECTOMY      and     TOTAL KNEE ARTHROPLASTY          VITALS:  /68 (BP Site: Left Upper Arm, Patient Position: Sitting, BP Cuff Size: Large Adult)   Pulse 68   Wt 71.2 kg (157 lb)   SpO2 98%   BMI 27.81 kg/m²   Wt Readings from Last 3 Encounters:   25 71.2 kg (157 lb)   25 72.1 kg (159 lb)   25 72.1 kg (159 lb)     Body mass index is 27.81 kg/m².     General Appearance: alert and cooperative with exam, appears comfortable, no distress  HEENT: EOMI, moist oral mucus membranes  Neck: No jugular venous distention,   Lungs: Air entry B/L, no crackles or rales, no use of accessory muscles  Heart: S1, S2 heard, no rub  GI: soft, non-tender, no guarding,   Extremities: trace leg edema  Skin: warm, dry  Neurologic: no slurred speech     Medications:  Current